# Patient Record
Sex: FEMALE | Race: WHITE | NOT HISPANIC OR LATINO | Employment: OTHER | URBAN - METROPOLITAN AREA
[De-identification: names, ages, dates, MRNs, and addresses within clinical notes are randomized per-mention and may not be internally consistent; named-entity substitution may affect disease eponyms.]

---

## 2017-01-04 ENCOUNTER — OFFICE VISIT (OUTPATIENT)
Dept: OBSTETRICS AND GYNECOLOGY | Facility: CLINIC | Age: 67
End: 2017-01-04

## 2017-01-04 VITALS
WEIGHT: 127 LBS | BODY MASS INDEX: 22.5 KG/M2 | DIASTOLIC BLOOD PRESSURE: 70 MMHG | SYSTOLIC BLOOD PRESSURE: 118 MMHG | HEIGHT: 63 IN

## 2017-01-04 DIAGNOSIS — B96.89 BACTERIAL VAGINOSIS: ICD-10-CM

## 2017-01-04 DIAGNOSIS — N76.0 BACTERIAL VAGINOSIS: ICD-10-CM

## 2017-01-04 DIAGNOSIS — B37.9 CANDIDIASIS: Primary | ICD-10-CM

## 2017-01-04 PROCEDURE — 87210 SMEAR WET MOUNT SALINE/INK: CPT | Performed by: OBSTETRICS & GYNECOLOGY

## 2017-01-04 PROCEDURE — 83986 ASSAY PH BODY FLUID NOS: CPT | Performed by: OBSTETRICS & GYNECOLOGY

## 2017-01-04 PROCEDURE — 99213 OFFICE O/P EST LOW 20 MIN: CPT | Performed by: OBSTETRICS & GYNECOLOGY

## 2017-01-04 RX ORDER — METRONIDAZOLE 7.5 MG/G
GEL VAGINAL DAILY
Qty: 70 G | Refills: 0 | Status: SHIPPED | OUTPATIENT
Start: 2017-01-04 | End: 2017-06-29

## 2017-01-04 RX ORDER — OCTISALATE, AVOBENZONE, HOMOSALATE, AND OCTOCRYLENE 29.4; 29.4; 49; 25.48 MG/ML; MG/ML; MG/ML; MG/ML
1 LOTION TOPICAL DAILY
COMMUNITY
End: 2017-06-29

## 2017-01-04 RX ORDER — FLUCONAZOLE 150 MG/1
150 TABLET ORAL EVERY OTHER DAY
Qty: 3 TABLET | Refills: 0 | Status: SHIPPED | OUTPATIENT
Start: 2017-01-04 | End: 2017-01-09

## 2017-01-04 NOTE — MR AVS SNAPSHOT
Xiao Dumont   1/4/2017 3:15 PM   Office Visit    Dept Phone:  [phone]   Encounter #:  [id number]    Provider:  Jose Brown MD   Department:  [facility] [place]                Your Full Care Plan              Your Updated Medication List          This list is accurate as of: 1/4/17  3:59 PM.  Always use your most recent med list.                ALIGN 4 MG capsule       aspirin 81 MG EC tablet       azelaic acid 15 % cream   Commonly known as:  AZELEX       BENEFIBER DRINK MIX PO       BEPREVE 1.5 % solution   Generic drug:  Bepotastine Besilate       CALCIUM 600+D 600-400 MG-UNIT per tablet   Generic drug:  calcium carbonate-vitamin d       conjugated estrogens 0.625 MG/GM vaginal cream   Commonly known as:  PREMARIN   Insert  into the vagina 3 (three) times a week. 0.5 gms 3 x a week       DAILY VITAMIN FORMULA PO       famotidine 40 MG tablet   Commonly known as:  PEPCID       hydrocortisone-pramoxine 2.5-1 % rectal cream   Commonly known as:  ANALPRAM-HC       Loratadine 10 MG capsule       lovastatin 40 MG tablet   Commonly known as:  MEVACOR       polyethyl glycol-propyl glycol 0.4-0.3 % solution ophthalmic solution   Commonly known as:  SYSTANE       tretinoin 0.05 % cream   Commonly known as:  RETIN-A               You Were Diagnosed With        Codes Comments    Candidiasis    -  Primary ICD-10-CM: B37.9  ICD-9-CM: 112.9     Bacterial vaginosis     ICD-10-CM: N76.0, B96.89  ICD-9-CM: 616.10, 041.9       Instructions     None    Patient Instructions History      Upcoming Appointments     Visit Type Date Time Department    GYN FOLLOW UP 1/4/2017  3:15 PM [facility]    GYN FOLLOW UP 6/29/2017  1:30 PM [facility]      MyChart Signup     Our records indicate that you have an active [employer] account.    You can view your After Visit Summary by going to [email or web address] and logging in with your  "[employer] username and password.  If you don't have a [employer] username and password but a parent or guardian has access to your record, the parent or guardian should login with their own [employer] username and password and access your record to view the After Visit Summary.    If you have questions, you can email [email or web address] or call [phone] to talk to our [employer] staff.  Remember, [employer] is NOT to be used for urgent needs.  For medical emergencies, dial 911.               Other Info from Your Visit           Your Appointments     Jun 29, 2017  1:30 PM EDT   GYN FOLLOW UP with Jose Brown MD   [facility] [place] (--)    [address] [place] [address] [address] [place] [id number]   [phone]              Allergies     Levaquin [Levofloxacin]  Rash    Sulfa Antibiotics  Rash      Reason for Visit     Vaginitis c/o vaginal malodor      Vital Signs     Blood Pressure Height Weight Last Menstrual Period Body Mass Index Smoking Status    118/70 62.5\" (158.8 cm) 127 lb (57.6 kg) (LMP Unknown) 22.86 kg/m2 Never Smoker      Problems and Diagnoses Noted     Candida infection    -  Primary    Bacterial vaginal infection            "

## 2017-01-04 NOTE — PROGRESS NOTES
"   Chief Complaint   Patient presents with   • Vaginitis     c/o vaginal malodor       Xiao Dumont is a 66 y.o. year old  presenting to be seen because of a three-week history of vaginal malodor, and itching.  She had a steroid injection 3 weeks ago.  She has not been on antibiotics.  She denies loose stools.    History   Sexual Activity   • Sexual activity: Yes   • Partners: Male   • Birth control/ protection: Post-menopausal, Surgical   She would not like to be screened for STD's at today's exam.       SCREENING TESTS    Year 2012 2016 2017 2018   Age                         PAP                         HPV high risk                         Mammogram                         STACEY score                         Breast MRI                         Lipids                         Vitamin D                         Colonoscopy                         DEXA  Frax (hip/any)                         Ovarian Screen                           Enter the month test was performed.  If month not known, enter \"X'  · Black numbers = normal results  · Red numbers = abnormal results  · Black X = patient reported normal  · Red X - patient reported abnormal      Referred by:    Profession:    Other info:        No Additional Complaints Reported    The following portions of the patient's history were reviewed and updated as appropriate:vital signs and   She  does not have any pertinent problems on file.  She  has a past surgical history that includes Hysterectomy; supracervical hysterectomy salpingo oophorectomy; Trigger finger release (Left); Tubal ligation; and Appendectomy.  Current Outpatient Prescriptions   Medication Sig Dispense Refill   • calcium carbonate-vitamin d (CALCIUM 600+D) 600-400 MG-UNIT per tablet Take 1 tablet by mouth Daily.     • Probiotic Product (ALIGN) 4 MG capsule Take 1 capsule by mouth Daily.     • aspirin 81 MG EC tablet " "Take 81 mg by mouth daily.     • azelaic acid (AZELEX) 15 % cream Apply 1 application topically 2 (two) times a day.     • BEPREVE 1.5 % solution INSERT 1 DROP INTO OU 2 TIMES DAILY UTD  3   • conjugated estrogens (PREMARIN) 0.625 MG/GM vaginal cream Insert  into the vagina 3 (three) times a week. 0.5 gms 3 x a week 30 g 3   • famotidine (PEPCID) 40 MG tablet Take 40 mg by mouth daily.     • fluconazole (DIFLUCAN) 150 MG tablet Take 1 tablet by mouth Every Other Day for 3 doses. 1 Every other day 3 tablet 0   • hydrocortisone-pramoxine (ANALPRAM-HC) 2.5-1 % rectal cream Insert 1 application into the rectum 3 (three) times a day.     • Loratadine 10 MG capsule Take 1 tablet by mouth as needed.     • lovastatin (MEVACOR) 40 MG tablet Take 40 mg by mouth every night.     • metroNIDAZOLE (METROGEL VAGINAL) 0.75 % vaginal gel Insert  into the vagina Daily. Insert intravaginally at bedtime for 5 days 70 g 0   • Multiple Vitamin (DAILY VITAMIN FORMULA PO) Take 1 tablet by mouth daily.     • polyethyl glycol-propyl glycol (SYSTANE) 0.4-0.3 % solution ophthalmic solution Administer 1 drop to both eyes daily.     • tretinoin (RETIN-A) 0.05 % cream Apply 1 application topically every night.     • Wheat Dextrin (BENEFIBER DRINK MIX PO) Take 1 dose by mouth daily.       No current facility-administered medications for this visit.      She is allergic to levaquin [levofloxacin] and sulfa antibiotics..        Review of Systems  A comprehensive review of systems was negative.  Constitutional: negative for fever, chills, activity change, appetite change, fatigue and unexpected weight change.  Respiratory: negative  Cardiovascular: negative  Gastrointestinal: negative  Genitourinary:positive for malodor and discharge  Musculoskeletal:negative  Behavioral/Psych: negative            Visit Vitals   • /70   • Ht 62.5\" (158.8 cm)   • Wt 127 lb (57.6 kg)   • LMP  (LMP Unknown)   • BMI 22.86 kg/m2       Physical Exam    General:  " alert; cooperative; well developed; well nourished   Skin:  No suspicious lesions seen   Thyroid: normal to inspection and palpation   Lungs:  clear to auscultation bilaterally   Heart:  regular rate and rhythm, S1, S2 normal, no murmur, click, rub or gallop   Breasts:  Not performed.   Abdomen: soft, non-tender; no masses  no umbilical or inginual hernias are present  no hepato-splenomegaly   Pelvis: Clinical staff was present for exam  External genitalia:  normal appearance of the external genitalia including Bartholin's and [place]'s glands  Vaginal:  normal pink mucosa without prolapse or lesions and wet prep done: vaginal pH = 4.0, clue cells are present and pseudo-hyphae are present  Cervix:  normal appearance  Uterus:  normal size, shape and consistency and anteverted  Adnexa:  non palpable bilaterally  Rectal:  digital rectal exam not performed     Lab Review   No data reviewed    Imaging   No data reviewed    Counseling was given to patient for the following topics: diagnostic results, instructions for management, risk factor reductions and impressions . Total time of the encounter was > 20 minute(s) and > 10 minute(s)  was spent counseling the patient about the need for combined therapy with MetroGel vaginal and the result.  She was instructed to avoid stool contamination into the vagina.          ASSESSMENT  Problems Addressed this Visit     None      Visit Diagnoses     Candidiasis    -  Primary    Relevant Medications    fluconazole (DIFLUCAN) 150 MG tablet    Bacterial vaginosis        Relevant Medications    metroNIDAZOLE (METROGEL VAGINAL) 0.75 % vaginal gel            PLAN    Medications prescribed this encounter:    New Medications Ordered This Visit   Medications   • calcium carbonate-vitamin d (CALCIUM 600+D) 600-400 MG-UNIT per tablet     Sig: Take 1 tablet by mouth Daily.   • Probiotic Product (ALIGN) 4 MG capsule     Sig: Take 1 capsule by mouth Daily.   • fluconazole (DIFLUCAN) 150 MG tablet      Sig: Take 1 tablet by mouth Every Other Day for 3 doses. 1 Every other day     Dispense:  3 tablet     Refill:  0     QOD   • metroNIDAZOLE (METROGEL VAGINAL) 0.75 % vaginal gel     Sig: Insert  into the vagina Daily. Insert intravaginally at bedtime for 5 days     Dispense:  70 g     Refill:  0   ·   · Follow up: 10 month(s)  · Calcium, 600 mg/ Vit. D, 400 IU daily     *Please note that portions of this documentation may have been completed with a voice recognition program.  Efforts were made to edit this dictation, but occasional words may have been mistranscribed.     This note was electronically signed.    LEONARD Brown MD  January 4, 2017  4:00 PM

## 2017-01-30 ENCOUNTER — TELEPHONE (OUTPATIENT)
Dept: OBSTETRICS AND GYNECOLOGY | Facility: CLINIC | Age: 67
End: 2017-01-30

## 2017-01-30 NOTE — TELEPHONE ENCOUNTER
"Pt calling with complaint that vaginal odor has come back . States she was here 1/4/17 but Dr BLACK \"could not check for everything because she had used Premarin Vag cream a few days prior\". We have scheduled her to come in 2/6/17 and instructed her not to use any vaginal products between now and her appt. She voices understanding.    "

## 2017-02-06 ENCOUNTER — OFFICE VISIT (OUTPATIENT)
Dept: OBSTETRICS AND GYNECOLOGY | Facility: CLINIC | Age: 67
End: 2017-02-06

## 2017-02-06 VITALS
WEIGHT: 127.8 LBS | SYSTOLIC BLOOD PRESSURE: 146 MMHG | BODY MASS INDEX: 22.64 KG/M2 | HEIGHT: 63 IN | DIASTOLIC BLOOD PRESSURE: 76 MMHG

## 2017-02-06 DIAGNOSIS — N89.8 VAGINAL ODOR: Primary | ICD-10-CM

## 2017-02-06 PROCEDURE — 83986 ASSAY PH BODY FLUID NOS: CPT | Performed by: OBSTETRICS & GYNECOLOGY

## 2017-02-06 PROCEDURE — 87210 SMEAR WET MOUNT SALINE/INK: CPT | Performed by: OBSTETRICS & GYNECOLOGY

## 2017-02-06 PROCEDURE — 99213 OFFICE O/P EST LOW 20 MIN: CPT | Performed by: OBSTETRICS & GYNECOLOGY

## 2017-02-06 NOTE — PROGRESS NOTES
"Chief Complaint   Patient presents with   • Vaginitis     c/o malodor       Xiao Dumont is a 66 y.o. year old  presenting to be seen for evaluation of vaginal symptoms.  She gives a 2-3 week history of vaginal malodor.  She has had some burning but no itching.  She states that she had odor before when she took a probiotic and she has restarted it.  She stopped using her Premarin cream but it did not seem to make a significant difference.  She has known vaginal atrophy.  She denies vaginal discharge.       A comprehensive review of systems was negative.  Constitutional: negative for fever, chills, activity change, appetite change, fatigue and unexpected weight change.  Respiratory: negative  Cardiovascular: negative  Gastrointestinal: negative  Genitourinary:negative  Musculoskeletal:negative  Behavioral/Psych: negative  Physical exam:  Lungs- Clear P&A  Heart- RRR with no murmur, rub or gallop  Abdomen-soft and nontender with no masses or organomegaly.  There is no suprapubic tenderness  Visit Vitals   • /76   • Ht 62.5\" (158.8 cm)   • Wt 127 lb 12.8 oz (58 kg)   • LMP  (LMP Unknown)   • BMI 23 kg/m2       {Findings; vagina (ob1):97308    normal appearing cervix without discharge or lesions, no discharge noted, WET MOUNT done - results: negative for pathogens, normal epithelial cells, KOH done, vaginal pH is 4.0, cervical motion tenderness absent    PLAN   1. Medications prescribed this encounter:  [unfilled]  2. Follow up:  Annual visit  *Please note that portions of this documentation may have been completed with a voice recognition program.  Efforts were made to edit this dictation, but occasional words may have been mistranscribed.      This note was electronically signed.    LEONARD Brown MD  2017  3:37 PM  "

## 2017-04-26 DIAGNOSIS — Z13.820 SCREENING FOR OSTEOPOROSIS: Primary | ICD-10-CM

## 2017-05-10 ENCOUNTER — HOSPITAL ENCOUNTER (OUTPATIENT)
Dept: BONE DENSITY | Facility: HOSPITAL | Age: 67
Discharge: HOME | End: 2017-05-10
Attending: OBSTETRICS & GYNECOLOGY | Admitting: OBSTETRICS & GYNECOLOGY

## 2017-05-10 DIAGNOSIS — Z13.820 SCREENING FOR OSTEOPOROSIS: ICD-10-CM

## 2017-05-10 PROCEDURE — 77080 DXA BONE DENSITY AXIAL: CPT

## 2017-05-10 PROCEDURE — 77080 DXA BONE DENSITY AXIAL: CPT | Performed by: RADIOLOGY

## 2017-06-29 ENCOUNTER — OFFICE VISIT (OUTPATIENT)
Dept: OBSTETRICS AND GYNECOLOGY | Facility: CLINIC | Age: 67
End: 2017-06-29

## 2017-06-29 VITALS
HEIGHT: 63 IN | SYSTOLIC BLOOD PRESSURE: 118 MMHG | WEIGHT: 129.6 LBS | DIASTOLIC BLOOD PRESSURE: 68 MMHG | BODY MASS INDEX: 22.96 KG/M2

## 2017-06-29 DIAGNOSIS — K64.4 EXTERNAL HEMORRHOIDS: ICD-10-CM

## 2017-06-29 DIAGNOSIS — N95.2 VAGINAL ATROPHY: ICD-10-CM

## 2017-06-29 DIAGNOSIS — Z78.0 MENOPAUSE: Primary | ICD-10-CM

## 2017-06-29 DIAGNOSIS — M85.80 OSTEOPENIA: ICD-10-CM

## 2017-06-29 PROCEDURE — G0101 CA SCREEN;PELVIC/BREAST EXAM: HCPCS | Performed by: OBSTETRICS & GYNECOLOGY

## 2017-06-29 NOTE — PROGRESS NOTES
"   Chief Complaint   Patient presents with   • Gynecologic Exam     ? urethral stenosis   • Med Refill       Xiao Dumont is a 67 y.o. year old  presenting to be seen for her annual exam.This patient has previously had a robotically-assisted LSH/BSO.  She has complaints of some difficulty initiating her urinary stream.  She has intermittent stress incontinence.  She has no symptoms of cystitis.  She uses Premarin vaginal cream 3 times a week for vaginal atrophy.  She has a history of external hemorrhoids.    SCREENING TESTS    Year 2012   Age                         PAP   Neg.                      HPV high risk                         Mammogram      benign                   STACEY score                         Breast MRI                         Lipids                         Vitamin D                         Colonoscopy                         DEXA  Frax (hip/any)      osteopenia                   Ovarian Screen                           Enter the month test was performed.  If month not known, enter \"X'  · Black numbers = normal results  · Red numbers = abnormal results  · Black X = patient reported normal  · Red X - patient reported abnormal      Referred by:    Profession:    Other info:        She exercises regularly: yes.  She wears her seat belt: yes.  She has concerns about domestic violence: no.  She has noticed changes in height: no    GYN screening history:  · Last mammogram: was done on approximately 2017 and the result was: Birads II (Benign findings).  · Last DEXA: was done on approximately 5/10/2017 and the results were: osteopenia of the femoral neck.    No Additional Complaints Reported    The following portions of the patient's history were reviewed and updated as appropriate:vital signs and   She  does not have any pertinent problems on file.  She  has a past surgical history that includes " Hysterectomy; supracervical hysterectomy salpingo oophorectomy; Trigger finger release (Left); Tubal ligation; and Appendectomy.  Her family history includes Breast cancer in her mother; Cancer in her brother; Dementia in her father; Diabetes in her paternal grandmother; Stroke in her brother, father, paternal grandfather, and paternal grandmother.  She  reports that she has never smoked. She has never used smokeless tobacco. She reports that she does not drink alcohol or use illicit drugs.  Current Outpatient Prescriptions   Medication Sig Dispense Refill   • aspirin 81 MG EC tablet Take 81 mg by mouth daily.     • azelaic acid (AZELEX) 15 % cream Apply 1 application topically 2 (two) times a day.     • calcium carbonate-vitamin d (CALCIUM 600+D) 600-400 MG-UNIT per tablet Take 1 tablet by mouth Daily.     • [START ON 6/30/2017] conjugated estrogens (PREMARIN) 0.625 MG/GM vaginal cream Insert  into the vagina 3 (Three) Times a Week. 0.5 gms 3 x a week 30 g 3   • famotidine (PEPCID) 40 MG tablet Take 40 mg by mouth daily.     • hydrocortisone-pramoxine (ANALPRAM-HC) 2.5-1 % rectal cream Insert 1 application into the rectum 2 (Two) Times a Day. 30 g 4   • Loratadine 10 MG capsule Take 1 tablet by mouth as needed.     • lovastatin (MEVACOR) 40 MG tablet Take 40 mg by mouth every night.     • Multiple Vitamin (DAILY VITAMIN FORMULA PO) Take 1 tablet by mouth daily.     • polyethyl glycol-propyl glycol (SYSTANE) 0.4-0.3 % solution ophthalmic solution Administer 1 drop to both eyes daily.     • tretinoin (RETIN-A) 0.05 % cream Apply 1 application topically every night.       No current facility-administered medications for this visit.      She is allergic to levaquin [levofloxacin] and sulfa antibiotics..    Review of Systems  A comprehensive review of systems was taken.  Constitutional: negative for fever, chills, activity change, appetite change, fatigue and unexpected weight change.  Respiratory:  "negative  Cardiovascular: negative  Gastrointestinal: negative  Genitourinary:positive for urinary incontinence  Musculoskeletal:negative  Behavioral/Psych: negative       /68  Ht 62.5\" (158.8 cm)  Wt 129 lb 9.6 oz (58.8 kg)  LMP  (LMP Unknown)  BMI 23.33 kg/m2    Physical Exam    General:  alert; cooperative; well developed; well nourished   Skin:  No suspicious lesions seen   Thyroid: normal to inspection and palpation   Lungs:  clear to auscultation bilaterally   Heart:  regular rate and rhythm, S1, S2 normal, no murmur, click, rub or gallop   Breasts:  Examined in supine position  Symmetric without masses or skin dimpling  Nipples normal without inversion, lesions or discharge  There are no palpable axillary nodes  fibrous change at 3:00 in the right breast   Abdomen: soft, non-tender; no masses  no umbilical or inginual hernias are present  no hepato-splenomegaly   Pelvis: Clinical staff was present for exam  External genitalia:  normal appearance of the external genitalia including Bartholin's and [place]'s glands.  Vaginal:  normal pink mucosa without prolapse or lesions.  Cervix:  normal appearance.  Uterus:  absent.  Adnexa:  absent, bilateral.  Rectal:  external hemorrhoids present;     Lab Review   No data reviewed    Imaging  Mammogram results- benign,  and DEXA- osteopenia of the femoral neck         ASSESSMENT  Problems Addressed this Visit        Cardiovascular and Mediastinum    External hemorrhoids    Relevant Medications    hydrocortisone-pramoxine (ANALPRAM-HC) 2.5-1 % rectal cream       Musculoskeletal and Integument    Osteopenia       Genitourinary    Vaginal atrophy    Relevant Medications    conjugated estrogens (PREMARIN) 0.625 MG/GM vaginal cream (Start on 6/30/2017)    Menopause - Primary          PLAN    Medications prescribed this encounter:    New Medications Ordered This Visit   Medications   • conjugated estrogens (PREMARIN) 0.625 MG/GM vaginal cream     Sig: Insert  into the " vagina 3 (Three) Times a Week. 0.5 gms 3 x a week     Dispense:  30 g     Refill:  3   • hydrocortisone-pramoxine (ANALPRAM-HC) 2.5-1 % rectal cream     Sig: Insert 1 application into the rectum 2 (Two) Times a Day.     Dispense:  30 g     Refill:  4   · Pap test done  · Calcium, 600 mg/ Vit. D, 400 IU daily; regular weight-bearing exercise  · Follow up: 12 month(s)  *Please note that portions of this documentation may have been completed with a voice recognition program.  Efforts were made to edit this dictation, but occasional words may have been mistranscribed.       This note was electronically signed.    LEONARD Brown MD  June 29, 2017  2:05 PM

## 2017-11-13 ENCOUNTER — OFFICE VISIT (OUTPATIENT)
Dept: OBSTETRICS AND GYNECOLOGY | Facility: CLINIC | Age: 67
End: 2017-11-13

## 2017-11-13 VITALS
BODY MASS INDEX: 23.07 KG/M2 | WEIGHT: 130.2 LBS | DIASTOLIC BLOOD PRESSURE: 82 MMHG | SYSTOLIC BLOOD PRESSURE: 136 MMHG | HEIGHT: 63 IN

## 2017-11-13 DIAGNOSIS — N95.2 VAGINAL ATROPHY: ICD-10-CM

## 2017-11-13 DIAGNOSIS — K64.4 EXTERNAL HEMORRHOIDS: ICD-10-CM

## 2017-11-13 DIAGNOSIS — Z78.0 MENOPAUSE: Primary | ICD-10-CM

## 2017-11-13 PROCEDURE — 99213 OFFICE O/P EST LOW 20 MIN: CPT | Performed by: OBSTETRICS & GYNECOLOGY

## 2017-11-13 NOTE — PROGRESS NOTES
Chief Complaint   Patient presents with   • Vaginitis     c/o vulvar burning, no malodor, very little discharge       Xiao Dumont is a 67 y.o. year old  presenting to be seen because of complaints of vulvar and perineal burning as well as discomfort in the perianal region.  This patient has had an LSH/BSO.  She is treated for vaginal atrophy with Premarin vaginal cream, 0.5 g 3 times a week with relief of symptoms.  Since 2017 she has noted significant burning in the perineal and perianal region.  This wakes her up at night with itching.  She has continued use of Premarin cream.  She denies other menopausal symptoms.  She has no vaginal discharge.    History   Sexual Activity   • Sexual activity: Yes   • Partners: Male   • Birth control/ protection: Post-menopausal, Surgical     SCREENING TESTS    Year 2012   Age                         PAP      Neg.                   HPV high risk                         Mammogram     benign benign                   STACEY score                         Breast MRI                         Lipids                         Vitamin D                         Colonoscopy                         DEXA  Frax (hip/any)      osteopenia                   Ovarian Screen                             No Additional Complaints Reported    The following portions of the patient's history were reviewed and updated as appropriate:vital signs and   She  does not have any pertinent problems on file.  She  has a past surgical history that includes Hysterectomy; supracervical hysterectomy salpingo oophorectomy; Trigger finger release (Left); Tubal ligation; and Appendectomy.  Current Outpatient Prescriptions   Medication Sig Dispense Refill   • aspirin 81 MG EC tablet Take 81 mg by mouth daily.     • azelaic acid (AZELEX) 15 % cream Apply 1 application topically 2 (two) times a day.     • calcium  "carbonate-vitamin d (CALCIUM 600+D) 600-400 MG-UNIT per tablet Take 1 tablet by mouth Daily.     • conjugated estrogens (PREMARIN) 0.625 MG/GM vaginal cream Insert  into the vagina 3 (Three) Times a Week. 0.5 gms 3 x a week 30 g 3   • famotidine (PEPCID) 40 MG tablet Take 40 mg by mouth daily.     • hydrocortisone-pramoxine (ANALPRAM-HC) 2.5-1 % rectal cream Insert 1 application into the rectum 2 (Two) Times a Day. 30 g 4   • Loratadine 10 MG capsule Take 1 tablet by mouth as needed.     • lovastatin (MEVACOR) 40 MG tablet Take 40 mg by mouth every night.     • Multiple Vitamin (DAILY VITAMIN FORMULA PO) Take 1 tablet by mouth daily.     • polyethyl glycol-propyl glycol (SYSTANE) 0.4-0.3 % solution ophthalmic solution Administer 1 drop to both eyes daily.     • tretinoin (RETIN-A) 0.05 % cream Apply 1 application topically every night.       No current facility-administered medications for this visit.      She is allergic to levaquin [levofloxacin] and sulfa antibiotics..        Review of Systems  A comprehensive review of systems was not done.  Constitutional: negative for fever, chills, activity change, appetite change, fatigue and unexpected weight change.  Respiratory: not done  Cardiovascular: not done  Gastrointestinal: perianal itching and burning  Genitourinary:negative  Musculoskeletal:not done  Behavioral/Psych: not done          /82  Ht 62.5\" (158.8 cm)  Wt 130 lb 3.2 oz (59.1 kg)  LMP  (LMP Unknown) Comment: S/P LSH, BSO  BMI 23.43 kg/m2    Physical Exam    General:  alert; cooperative; well developed; well nourished   Skin:  Not performed.   Thyroid: not examined   Lungs:  Not performed.   Heart:  regular rate and rhythm, S1, S2 normal, no murmur, click, rub or gallop   Breasts:  Not performed.   Abdomen: soft, non-tender; no masses  no umbilical or inginual hernias are present  no hepato-splenomegaly   Pelvis: Clinical staff was present for exam  External genitalia:  normal appearance of the " external genitalia including Bartholin's and [place]'s glands.  Vaginal:  normal pink mucosa without prolapse or lesions.  Cervix:  normal appearance.  Uterus:  absent.  Adnexa:  absent, bilateral.  Rectal:  external hemorrhoids present; with erythema and ulceration of hemorrhoids     Lab Review   No data reviewed    Imaging   Mammogram results- benign, and DEXA- moderate osteopenia of the femoral neck    Medical counseling was given to patient for the following topics: diagnostic results, instructions for management, risk factor reductions, impressions and risks and benefits of treatment options . Total time of the encounter was 17 minute(s) and 11 minute(s)  was spent in face to face counseling.  I have counseled the patient that her vaginal and vulvar atrophy is improved using the Premarin vaginal cream 3 times a week.  I have counseled her that she has erythema and ulceration of her hemorrhoids.  I have counseled her to see the colon rectal surgeons regarding this.  Would not currently change her Premarin cream dose.          ASSESSMENT  Problems Addressed this Visit        Cardiovascular and Mediastinum    External hemorrhoids       Genitourinary    Vaginal atrophy    Menopause - Primary            PLAN    · Medications prescribed this encounter:  No orders of the defined types were placed in this encounter.  ·   · Follow up: 8 month(s)  · Calcium, 600 mg/ Vit. D, 400 IU daily     *Please note that portions of this documentation may have been completed with a voice recognition program.  Efforts were made to edit this dictation, but occasional words may have been mistranscribed.     This note was electronically signed.    LEONARD Brown MD  November 13, 2017  9:26 AM

## 2018-01-08 ENCOUNTER — OFFICE VISIT (OUTPATIENT)
Dept: OBSTETRICS AND GYNECOLOGY | Facility: CLINIC | Age: 68
End: 2018-01-08

## 2018-01-08 VITALS
DIASTOLIC BLOOD PRESSURE: 72 MMHG | SYSTOLIC BLOOD PRESSURE: 130 MMHG | WEIGHT: 128 LBS | BODY MASS INDEX: 22.68 KG/M2 | HEIGHT: 63 IN

## 2018-01-08 DIAGNOSIS — N95.2 VAGINAL ATROPHY: ICD-10-CM

## 2018-01-08 DIAGNOSIS — N89.8 VAGINAL ODOR: ICD-10-CM

## 2018-01-08 DIAGNOSIS — Z78.0 MENOPAUSE: Primary | ICD-10-CM

## 2018-01-08 PROCEDURE — 87210 SMEAR WET MOUNT SALINE/INK: CPT | Performed by: OBSTETRICS & GYNECOLOGY

## 2018-01-08 PROCEDURE — 83986 ASSAY PH BODY FLUID NOS: CPT | Performed by: OBSTETRICS & GYNECOLOGY

## 2018-01-08 PROCEDURE — 99213 OFFICE O/P EST LOW 20 MIN: CPT | Performed by: OBSTETRICS & GYNECOLOGY

## 2018-01-08 RX ORDER — ESTRADIOL 0.1 MG/G
0.5 CREAM VAGINAL 3 TIMES WEEKLY
Qty: 42.5 G | Refills: 3 | Status: SHIPPED | OUTPATIENT
Start: 2018-01-08 | End: 2018-01-09 | Stop reason: SDUPTHER

## 2018-01-08 RX ORDER — ESTRADIOL 0.1 MG/G
CREAM VAGINAL
Qty: 42.5 G | Refills: 3 | OUTPATIENT
Start: 2018-01-08 | End: 2018-01-08 | Stop reason: SDUPTHER

## 2018-01-08 NOTE — PROGRESS NOTES
"Chief Complaint   Patient presents with   • Vaginal Discharge     c/o malodor.  FYI, patient was instructed at last office visit to see colorectal doctor due to inflammation.  PCP advised patient to discontinue use of wipes and analpram, and symptoms have resolved.       Xiao Dumont is a 67 y.o. year old  presenting to be seen for evaluation of vaginal symptoms.This patient has vaginal atrophy and is currently treated with Premarin vaginal cream, 0.5 g 3 times a week.  She has noted recent vaginal malodor.  She denies itching or burning.  Her recent perianal inflammation resolved when she discontinued the use of wipes.  She is taking no.X or steroids.       A comprehensive review of systems was done.  Constitutional: negative for fever, chills, activity change, appetite change, fatigue and unexpected weight change.  Respiratory: negative  Cardiovascular: negative  Gastrointestinal: negative  Genitourinary:positive for vaginal odor  Musculoskeletal:negative  Behavioral/Psych: negative  Physical exam:  Lungs- Clear to auscultation  Heart- RRR with no murmur, rub or gallop  Abdomen- Soft, non-tender, no organomegaly or tenderness  /72  Ht 158.8 cm (62.5\")  Wt 58.1 kg (128 lb)  LMP  (LMP Unknown) Comment: S/P LSH, BSO  BMI 23.04 kg/m2     Pelvis:Clinical staff was present for exam  External genitalia:  normal appearance of the external genitalia including Bartholin's and [place]'s glands.  Vaginal:  normal pink mucosa without prolapse or lesions. pH = 4.0 wet prep done: normal epithelial cells are present;  Cervix:  normal appearance.  Uterus:  absent.  Adnexa:  absent, bilateral.  Rectal:  anus visually normal appearing. recto-vaginal exam unremarkable and confirms findings;     ASSESSMENT  Problems Addressed this Visit        Genitourinary    Vaginal atrophy    Relevant Medications    estradiol (ESTRACE VAGINAL) 0.1 MG/GM vaginal cream    Menopause - Primary      Other Visit Diagnoses     Vaginal odor     "      No evidence of vaginal infection    PLAN   Medications prescribed this encounter:    New Medications Ordered This Visit   Medications   • estradiol (ESTRACE VAGINAL) 0.1 MG/GM vaginal cream     Sig: Insert o.5 gm intravaginally 3 times a week     Dispense:  42.5 g     Refill:  3   1. I have change the patient from Premarin vaginal cream to Estrace vaginal cream to see if the odor resolves  2. Follow up: 6 month(s)  *Please note that portions of this documentation may have been completed with a voice recognition program.  Efforts were made to edit this dictation, but occasional words may have been mistranscribed.      This note was electronically signed.    LEONARD Brown MD  January 8, 2018  2:55 PM

## 2018-01-08 NOTE — TELEPHONE ENCOUNTER
Patient's prescription was sent to an incorrect pharmacy.  Pharmacy information has been updated, and I will send to correct pharmacy.

## 2018-01-09 RX ORDER — ESTRADIOL 0.1 MG/G
0.5 CREAM VAGINAL 3 TIMES WEEKLY
Qty: 42.5 G | Refills: 3 | Status: SHIPPED | OUTPATIENT
Start: 2018-01-10 | End: 2018-10-09 | Stop reason: SDUPTHER

## 2018-01-09 NOTE — TELEPHONE ENCOUNTER
Patient called today to request pharmacy change.  States that she would like her estrace vaginal cream prescription to go to [facility] in [place], and also states that she called [facility] to cancel the prescription sent there.

## 2018-07-09 ENCOUNTER — OFFICE VISIT (OUTPATIENT)
Dept: OBSTETRICS AND GYNECOLOGY | Facility: CLINIC | Age: 68
End: 2018-07-09

## 2018-07-09 VITALS
SYSTOLIC BLOOD PRESSURE: 116 MMHG | BODY MASS INDEX: 22.71 KG/M2 | HEIGHT: 63 IN | WEIGHT: 128.2 LBS | DIASTOLIC BLOOD PRESSURE: 68 MMHG

## 2018-07-09 DIAGNOSIS — Z78.0 MENOPAUSE: Primary | ICD-10-CM

## 2018-07-09 DIAGNOSIS — M85.88 OSTEOPENIA OF OTHER SITE: ICD-10-CM

## 2018-07-09 DIAGNOSIS — N95.2 VAGINAL ATROPHY: ICD-10-CM

## 2018-07-09 PROCEDURE — 99213 OFFICE O/P EST LOW 20 MIN: CPT | Performed by: OBSTETRICS & GYNECOLOGY

## 2018-07-09 NOTE — PROGRESS NOTES
Chief Complaint   Patient presents with   • Gynecologic Exam   • Agapito Beena Dumont is a 68 y.o. year old  presenting to be seen because of follow up for treatment of severe vulvar and vaginal irritation which was related to vaginal atrophy.  This patient was changed to Estrace vaginal cream in a 3 times/weekly dosage and has had significant decrease in vaginal dryness and vulvar irritation.  She desires to change back to Premarin vaginal cream which she was using previously.  She denies side effects.  She has no cardiovascular symptoms.  Breast imaging has been benign.  She has previously had a robotically--assisted LSH/BSO.  She has had an appendectomy.  She has mild osteopenia of the right femoral neck with no treatment other than daily calcium/vitamin D and regular weight-bearing exercise.    History   Sexual Activity   • Sexual activity: Yes   • Partners: Male   • Birth control/ protection: Post-menopausal, Surgical     SCREENING TESTS    Year 2012   Age                         PAP      Neg.                   HPV high risk                         Mammogram      benign benign                  STACEY score                         Breast MRI                         Lipids                         Vitamin D                         Colonoscopy                         DEXA  Frax (hip/any)      osteopenia                   Ovarian Screen                             No Additional Complaints Reported    The following portions of the patient's history were reviewed and updated as appropriate:vital signs and   She  has a past medical history of GERD (gastroesophageal reflux disease); Hyperlipidemia; Menopause; Osteoarthritis; Osteopenia; Rosacea, acne; Seasonal allergies; and Vaginal atrophy.  She  does not have any pertinent problems on file.  She  has a past surgical history that includes Hysterectomy;  "supracervical hysterectomy salpingo oophorectomy; Trigger finger release (Left); Tubal ligation; and Appendectomy.  Her family history includes Breast cancer in her mother; Cancer in her brother; Dementia in her father; Diabetes in her paternal grandmother; Stroke in her brother, father, paternal grandfather, and paternal grandmother.  She  reports that she has never smoked. She has never used smokeless tobacco. She reports that she does not drink alcohol or use drugs.  Current Outpatient Prescriptions   Medication Sig Dispense Refill   • aspirin 81 MG EC tablet Take 81 mg by mouth daily.     • Azelaic Acid (FINACEA) 15 % foam Apply  topically.     • calcium carbonate-vitamin d (CALCIUM 600+D) 600-400 MG-UNIT per tablet Take 1 tablet by mouth Daily.     • estradiol (ESTRACE VAGINAL) 0.1 MG/GM vaginal cream Insert 0.5 g into the vagina 3 (Three) Times a Week. 42.5 g 3   • famotidine (PEPCID) 40 MG tablet Take 40 mg by mouth daily.     • Loratadine 10 MG capsule Take 1 tablet by mouth as needed.     • lovastatin (MEVACOR) 40 MG tablet Take 40 mg by mouth every night.     • Multiple Vitamin (DAILY VITAMIN FORMULA PO) Take 1 tablet by mouth daily.     • polyethyl glycol-propyl glycol (SYSTANE) 0.4-0.3 % solution ophthalmic solution Administer 1 drop to both eyes daily.     • tretinoin (RETIN-A) 0.05 % cream Apply 1 application topically every night.       No current facility-administered medications for this visit.      She is allergic to levaquin [levofloxacin] and sulfa antibiotics..        Review of Systems  A comprehensive review of systems was not done.  Constitutional: negative for fever, chills, activity change, appetite change, fatigue and unexpected weight change.  Respiratory: not done  Cardiovascular: negative  Gastrointestinal: negative  Genitourinary:negative  Musculoskeletal:negative  Behavioral/Psych: not done          /68   Ht 158.8 cm (62.5\")   Wt 58.2 kg (128 lb 3.2 oz)   LMP  (LMP Unknown) " Comment: S/P LSH, BSO  BMI 23.07 kg/m²     Physical Exam    General:  alert; cooperative; well developed; well nourished   Skin:  Not performed.   Thyroid: not examined   Lungs:  clear to auscultation bilaterally   Heart:  regular rate and rhythm, S1, S2 normal, no murmur, click, rub or gallop   Breasts:  Examined in supine position  Symmetric without masses or skin dimpling  Nipples normal without inversion, lesions or discharge  There are no palpable axillary nodes   Abdomen: soft, non-tender; no masses  no umbilical or inginual hernias are present  no hepato-splenomegaly   Pelvis: Clinical staff was present for exam  External genitalia:  normal appearance of the external genitalia including Bartholin's and [place]'s glands.  Vaginal:  normal pink mucosa without prolapse or lesions.  Cervix:  normal appearance.  Uterus:  absent.  Adnexa:  absent, bilateral.  Rectal:  anus visually normal appearing. recto-vaginal exam unremarkable and confirms findings;     Lab Review   No data reviewed    Imaging   Mammogram results- Birads II, and DEXA- mild osteopenia of the right femoral neck with the spine and total hip being normal    Medical counseling was given to patient for the following topics: diagnostic results, instructions for management, risk factor reductions, impressions, risks and benefits of treatment options and importance of treatment compliance . Total time of the encounter was 19 minute(s) and 11 minute(s)  was spent in face to face counseling.  I have counseled the patient that she needs to continue using estrogen cream.  I have counseled her that we may switch back to Premarin vaginal cream since it is cheaper for her.  I have counseled her in monthly self-breast assessment and annual breast imaging.  I have counseled her to continue weight-bearing exercise and to take calcium with vitamin D daily.          ASSESSMENT  Problems Addressed this Visit        Musculoskeletal and Integument    Osteopenia        Genitourinary    Vaginal atrophy    Menopause - Primary            PLAN    · Medications prescribed this encounter:  No orders of the defined types were placed in this encounter.  · Monthly self breast assessment and annual breast imaging  · The patient will call when she needs a refill on Premarin vaginal cream, 0.5 g 3 times a week  · Follow up: 12 month(s)  · Calcium, 600 mg/ Vit. D, 400 IU daily     *Please note that portions of this documentation may have been completed with a voice recognition program.  Efforts were made to edit this dictation, but occasional words may have been mistranscribed.     This note was electronically signed.    LEONARD Brown MD  July 9, 2018  1:35 PM

## 2018-10-09 ENCOUNTER — TELEPHONE (OUTPATIENT)
Dept: OBSTETRICS AND GYNECOLOGY | Facility: CLINIC | Age: 68
End: 2018-10-09

## 2018-10-09 RX ORDER — ESTRADIOL 0.1 MG/G
0.5 CREAM VAGINAL 3 TIMES WEEKLY
Qty: 42.5 G | Refills: 6 | Status: SHIPPED | OUTPATIENT
Start: 2018-10-10 | End: 2019-07-10 | Stop reason: ALTCHOICE

## 2018-10-09 NOTE — TELEPHONE ENCOUNTER
Patient was seen 7/9/18 for annual exam but did not need a prescription refill for estrace cream at that time.  She calls today requesting a new prescription.

## 2019-07-10 ENCOUNTER — OFFICE VISIT (OUTPATIENT)
Dept: OBSTETRICS AND GYNECOLOGY | Facility: CLINIC | Age: 69
End: 2019-07-10

## 2019-07-10 VITALS
WEIGHT: 127 LBS | DIASTOLIC BLOOD PRESSURE: 58 MMHG | SYSTOLIC BLOOD PRESSURE: 124 MMHG | BODY MASS INDEX: 22.5 KG/M2 | HEIGHT: 63 IN

## 2019-07-10 DIAGNOSIS — Z01.419 ENCOUNTER FOR GYNECOLOGICAL EXAMINATION WITHOUT ABNORMAL FINDING: ICD-10-CM

## 2019-07-10 DIAGNOSIS — Z78.0 MENOPAUSE: Primary | ICD-10-CM

## 2019-07-10 DIAGNOSIS — N95.2 VAGINAL ATROPHY: ICD-10-CM

## 2019-07-10 PROCEDURE — G0101 CA SCREEN;PELVIC/BREAST EXAM: HCPCS | Performed by: OBSTETRICS & GYNECOLOGY

## 2019-07-10 NOTE — PROGRESS NOTES
Chief Complaint   Patient presents with   • Gynecologic Exam   • Med Refill     would like compounded estradiol cream       Xiao Dumont is a 69 y.o. year old  presenting to be seen for her annual exam.  This patient has previously had tubal sterilization and has had an appendectomy.  She underwent an LSH/BSO.  She uses estradiol vaginal cream, 0.5 g 3 times a week to treat symptoms of vaginal atrophy.  This has become extremely expensive and she desires a compounded product.  She has no side effects on estrogen vaginal cream.  She denies bowel or urinary symptoms.  She has a history of moderate osteopenia of the femoral neck.  She has had no fractures.    SCREENING TESTS    Year 2012   Age                         PAP      Neg.                   HPV high risk                         Mammogram       benign benign                 STACEY score                         Breast MRI                         Lipids                         Vitamin D                         Colonoscopy                         DEXA  Frax (hip/any)      Penia                   Ovarian Screen                             She exercises regularly: yes.  She wears her seat belt: yes.  She has concerns about domestic violence: no.  She has noticed changes in height: no    GYN screening history:  · Last mammogram: was done on approximately 2019 and the result was: Birads II (Benign findings).  · Last DEXA: was done on approximately 5/10/2017 and the results were: moderate osteopenia of the right femoral neck.    No Additional Complaints Reported    The following portions of the patient's history were reviewed and updated as appropriate:vital signs and   She  has a past medical history of GERD (gastroesophageal reflux disease), Hyperlipidemia, Menopause, Osteoarthritis, Osteopenia, Rosacea, acne, Seasonal allergies, and Vaginal atrophy.  She  "does not have any pertinent problems on file.  She  has a past surgical history that includes Hysterectomy; supracervical hysterectomy salpingo oophorectomy; Trigger finger release (Left); Tubal ligation; and Appendectomy.  Her family history includes Breast cancer in her mother; Cancer in her brother; Dementia in her father; Diabetes in her paternal grandmother; Stroke in her brother, father, paternal grandfather, and paternal grandmother.  She  reports that she has never smoked. She has never used smokeless tobacco. She reports that she does not drink alcohol or use drugs.  Current Outpatient Medications   Medication Sig Dispense Refill   • aspirin 81 MG EC tablet Take 81 mg by mouth daily.     • Azelaic Acid (FINACEA) 15 % foam Apply  topically.     • calcium carbonate-vitamin d (CALCIUM 600+D) 600-400 MG-UNIT per tablet Take 1 tablet by mouth Daily.     • famotidine (PEPCID) 40 MG tablet Take 40 mg by mouth daily.     • Loratadine 10 MG capsule Take 1 tablet by mouth as needed.     • lovastatin (MEVACOR) 40 MG tablet Take 40 mg by mouth every night.     • Multiple Vitamin (DAILY VITAMIN FORMULA PO) Take 1 tablet by mouth daily.     • polyethyl glycol-propyl glycol (SYSTANE) 0.4-0.3 % solution ophthalmic solution Administer 1 drop to both eyes daily.     • tretinoin (RETIN-A) 0.05 % cream Apply 1 application topically every night.       No current facility-administered medications for this visit.      She is allergic to levaquin [levofloxacin]; sulfa antibiotics; and sulfamethoxazole-trimethoprim..    Review of Systems  A comprehensive review of systems was taken.  Constitutional: negative for fever, chills, activity change, appetite change, fatigue and unexpected weight change.  Respiratory: negative  Cardiovascular: negative  Gastrointestinal: negative  Genitourinary:negative  Musculoskeletal:negative  Behavioral/Psych: negative       /58   Ht 158.8 cm (62.5\")   Wt 57.6 kg (127 lb)   LMP  (LMP Unknown) " Comment: S/P LSH, BSO  Breastfeeding? No   BMI 22.86 kg/m²     Physical Exam    General:  alert; cooperative; well developed; well nourished   Skin:  No suspicious lesions seen   Thyroid: normal to inspection and palpation   Lungs:  clear to auscultation bilaterally   Heart:  regular rate and rhythm, S1, S2 normal, no murmur, click, rub or gallop   Breasts:  Examined in supine position  Symmetric without masses or skin dimpling  Nipples normal without inversion, lesions or discharge  There are no palpable axillary nodes  Fibrocystic changes are present both breasts without a discrete mass   Abdomen: soft, non-tender; no masses  no umbilical or inguinal hernias are present  no hepato-splenomegaly   Pelvis: Clinical staff was present for exam  External genitalia:  normal appearance of the external genitalia including Bartholin's and [place]'s glands.  Vaginal:  normal pink mucosa without prolapse or lesions. pH = 4.5  Cervix:  normal appearance.  Uterus:  absent.  Adnexa:  absent, bilateral.  Rectal:  anus visually normal appearing. recto-vaginal exam unremarkable and confirms findings;     Lab Review   No data reviewed    Imaging  Mammogram results- Birads II, and DEXA- moderate osteopenia of the right femoral neck with the total hip and spine normal         ASSESSMENT  Problems Addressed this Visit        Genitourinary    Vaginal atrophy    Menopause - Primary      Other Visit Diagnoses     Encounter for gynecological examination without abnormal finding              PLAN    · Medications prescribed this encounter:  No orders of the defined types were placed in this encounter.  · Monthly self breast assessment and annual breast imaging  · Compounded estradiol cream, 0.5 g intravaginally 3 times a week  · Calcium, 600 mg/ Vit. D, 400 IU daily; regular weight-bearing exercise  · Follow up: 12 month(s)  *Please note that portions of this documentation may have been completed with a voice recognition program.  Efforts  were made to edit this dictation, but occasional words may have been mistranscribed.       This note was electronically signed.    LEONARD Brown MD  July 10, 2019  1:21 PM

## 2019-09-11 ENCOUNTER — TELEPHONE (OUTPATIENT)
Dept: OBSTETRICS AND GYNECOLOGY | Facility: CLINIC | Age: 69
End: 2019-09-11

## 2019-09-11 RX ORDER — ESTRADIOL 0.1 MG/G
CREAM VAGINAL
COMMUNITY
End: 2020-07-15 | Stop reason: ALTCHOICE

## 2020-01-21 DIAGNOSIS — M85.89 OSTEOPENIA OF MULTIPLE SITES: Primary | ICD-10-CM

## 2020-03-10 ENCOUNTER — OFFICE VISIT (OUTPATIENT)
Dept: OBSTETRICS AND GYNECOLOGY | Facility: CLINIC | Age: 70
End: 2020-03-10

## 2020-03-10 VITALS
BODY MASS INDEX: 23 KG/M2 | DIASTOLIC BLOOD PRESSURE: 76 MMHG | WEIGHT: 129.8 LBS | SYSTOLIC BLOOD PRESSURE: 134 MMHG | HEIGHT: 63 IN

## 2020-03-10 DIAGNOSIS — Z78.0 MENOPAUSE: ICD-10-CM

## 2020-03-10 DIAGNOSIS — N95.2 VAGINAL ATROPHY: Primary | ICD-10-CM

## 2020-03-10 PROCEDURE — 99213 OFFICE O/P EST LOW 20 MIN: CPT | Performed by: OBSTETRICS & GYNECOLOGY

## 2020-03-10 NOTE — PROGRESS NOTES
Chief Complaint   Patient presents with   • Follow-up     c/o vaginal pressure.  patient noted that the estrogen cream applicator was difficult to insert with last application.       Xiao Dumont is a 69 y.o. year old  presenting to be seen because of concern about a possible change in her pelvic examination.  This patient has had tubal sterilization; a laparoscopic supracervical hysterectomy/bilateral salpingo-oophorectomy; and an appendectomy.  She is currently treated for vaginal atrophy with estradiol vaginal cream, 0.5 g 3 times a week.  She has experienced a decrease in vaginal irritation using this medication.  She has no side effects on estradiol vaginal cream.  Last week she noted some discomfort with insertion of the estrogen cream.  She also began to experience some pelvic pressure and irritation.  She denies vaginal itching or burning.    Social History     Substance and Sexual Activity   Sexual Activity Yes   • Partners: Male   • Birth control/protection: Post-menopausal, Surgical   ,          SCREENING TESTS    Year 2012   Age                         PAP                         HPV high risk                         Mammogram        benign                 STACEY score                         Breast MRI                         Lipids                         Vitamin D                         Colonoscopy                         DEXA  Frax (hip/any)                         Ovarian Screen                             No Additional Complaints Reported    The following portions of the patient's history were reviewed and updated as appropriate:vital signs and   She  has a past medical history of GERD (gastroesophageal reflux disease), Hyperlipidemia, Menopause, Osteoarthritis, Osteopenia, Rosacea, acne, Seasonal allergies, and Vaginal atrophy.  She does not have any pertinent problems on file.  She  has a  past surgical history that includes Hysterectomy; supracervical hysterectomy salpingo oophorectomy; Trigger finger release (Left); Tubal ligation; and Appendectomy.  Her family history includes Breast cancer in her mother; Cancer in her brother; Dementia in her father; Diabetes in her paternal grandmother; Stroke in her brother, father, paternal grandfather, and paternal grandmother.  She  reports that she has never smoked. She has never used smokeless tobacco. She reports that she does not drink alcohol or use drugs.  Current Outpatient Medications   Medication Sig Dispense Refill   • aspirin 81 MG EC tablet Take 81 mg by mouth daily.     • Azelaic Acid (FINACEA) 15 % foam Apply  topically.     • calcium carbonate-vitamin d (CALCIUM 600+D) 600-400 MG-UNIT per tablet Take 1 tablet by mouth Daily.     • estradiol (ESTRACE) 0.1 MG/GM vaginal cream estradiol 0.01% (0.1 mg/gram) vaginal cream     • famotidine (PEPCID) 40 MG tablet Take 40 mg by mouth daily.     • Hydrocortisone Ace-Pramoxine (ANALPRAM-HC RE) Analpram-HC     • Loratadine 10 MG capsule Take 1 tablet by mouth as needed.     • lovastatin (MEVACOR) 40 MG tablet Take 40 mg by mouth every night.     • Multiple Vitamin (DAILY VITAMIN FORMULA PO) Take 1 tablet by mouth daily.     • Multiple Vitamins-Minerals (CENTRUM ADULTS PO) Centrum     • polyethyl glycol-propyl glycol (SYSTANE) 0.4-0.3 % solution ophthalmic solution Administer 1 drop to both eyes daily.     • tretinoin (RETIN-A) 0.05 % cream Apply 1 application topically every night.       No current facility-administered medications for this visit.      She is allergic to levaquin [levofloxacin]; sulfa antibiotics; and sulfamethoxazole-trimethoprim..        Review of Systems  A comprehensive review of systems was done.  Constitutional: negative for fever, chills, activity change, appetite change, fatigue and unexpected weight change.  Respiratory: negative  Cardiovascular: negative  Gastrointestinal:  "negative  Genitourinary:negative  Musculoskeletal:negative  Behavioral/Psych: negative          /76   Ht 158.8 cm (62.5\")   Wt 58.9 kg (129 lb 12.8 oz)   LMP  (LMP Unknown) Comment: S/P LSH, BSO  Breastfeeding No   BMI 23.36 kg/m²     Physical Exam    General:  alert; cooperative; well developed; well nourished   Skin:  Not performed.   Thyroid: normal to inspection and palpation   Lungs:  clear to auscultation bilaterally   Heart:  regular rate and rhythm, S1, S2 normal, no murmur, click, rub or gallop   Breasts:  Not performed.   Abdomen: soft, non-tender; no masses  no umbilical or inguinal hernias are present  no hepato-splenomegaly   Pelvis: Clinical staff was present for exam  External genitalia:  normal appearance of the external genitalia including Bartholin's and [place]'s glands.  Vaginal:  normal pink mucosa without prolapse or lesions.  Cervix:  normal appearance.  Uterus:  absent.  Adnexa:  absent, bilateral.  Rectal:  anus visually normal appearing. recto-vaginal exam unremarkable and confirms findings;     Lab Review   No data reviewed    Imaging   Mammogram results    Medical counseling was given to patient for the following topics: diagnostic results, instructions for management, risk factor reductions, prognosis, impressions and risks and benefits of treatment options . Total time of the encounter was 18 minute(s) and 11 minute(s)  was spent in face to face counseling.  I have advised the patient that she does not have a cystocele or rectocele requiring surgical correction.  I have advised her that the estrogen cream is effectively treating vaginal atrophy.  I have advised the patient that her cervix is not prolapsed.  I have counseled her to continue using estradiol vaginal cream and a dose of 0.5 g intravaginally 3 times a week.  I have re--instructed her about inserting the applicator and a posterior rather than an anterior direction.  I have encouraged her to do Kegel's exercises " regularly.          ASSESSMENT  Problems Addressed this Visit        Genitourinary    Vaginal atrophy - Primary    Menopause           Substance History:    reports that she has never smoked. She has never used smokeless tobacco.    reports that she does not drink alcohol.    reports that she does not use drugs.    Substance use counseling is not indicated based on patient history.      PLAN    · Medications prescribed this encounter:  No orders of the defined types were placed in this encounter.  · Kegel's exercises daily, avoid heavy lifting and straining  · Continue using estradiol vaginal cream and a dose of 0.5 g intravaginally 3 times a week  · Follow up: 4 month(s) for AG  · Calcium, 600 mg/ Vit. D, 400 IU daily, regular, weight-bearing exercise, 4 days a week     *Please note that portions of this documentation may have been completed with a voice recognition program.  Efforts were made to edit this dictation, but occasional words may have been mistranscribed.     This note was electronically signed.    LEONARD Brown MD  March 10, 2020  14:47

## 2020-05-15 ENCOUNTER — APPOINTMENT (OUTPATIENT)
Dept: BONE DENSITY | Facility: HOSPITAL | Age: 70
End: 2020-05-15

## 2020-07-15 ENCOUNTER — OFFICE VISIT (OUTPATIENT)
Dept: OBSTETRICS AND GYNECOLOGY | Facility: CLINIC | Age: 70
End: 2020-07-15

## 2020-07-15 VITALS
DIASTOLIC BLOOD PRESSURE: 78 MMHG | BODY MASS INDEX: 22.86 KG/M2 | HEIGHT: 63 IN | WEIGHT: 129 LBS | SYSTOLIC BLOOD PRESSURE: 120 MMHG

## 2020-07-15 DIAGNOSIS — N95.2 VAGINAL ATROPHY: ICD-10-CM

## 2020-07-15 DIAGNOSIS — Z78.0 MENOPAUSE: Primary | ICD-10-CM

## 2020-07-15 DIAGNOSIS — Z80.3 FAMILY HISTORY OF MALIGNANT NEOPLASM OF BREAST: ICD-10-CM

## 2020-07-15 DIAGNOSIS — M85.851 OSTEOPENIA OF NECK OF RIGHT FEMUR: ICD-10-CM

## 2020-07-15 PROCEDURE — 99213 OFFICE O/P EST LOW 20 MIN: CPT | Performed by: OBSTETRICS & GYNECOLOGY

## 2020-07-15 NOTE — PROGRESS NOTES
Chief Complaint   Patient presents with   • Gynecologic Exam   • Med Refmelissa Dumont is a 70 y.o. year old  presenting to be seen because of follow-up for menopause with vaginal atrophy which is treated successfully with compounded estradiol cream, 0.1 mg/gram and a dose of 0.5 g intravaginally 3 times a week.  She has no side effects on this medication.  She has previously had an appendectomy, tubal sterilization, and a laparoscopic supracervical hysterectomy/bilateral salpingo-oophorectomy.  She was having difficulty inserting her estrogen cream last year but has corrected the direction of insertion.  She denies bowel or urinary symptoms.  She has mild osteopenia of the right femoral neck with the spine and hip being normal.  She has had no atraumatic fractures.    Social History     Substance and Sexual Activity   Sexual Activity Yes   • Partners: Male   • Birth control/protection: Post-menopausal, Surgical     SCREENING TESTS    Year 2012   Age                         PAP                         HPV high risk                         Mammogram        benign benign                STACEY score                         Breast MRI                         Lipids                         Vitamin D                         Colonoscopy                         DEXA  Frax (hip/any)      osteopenia                   Ovarian Screen                             No Additional Complaints Reported    The following portions of the patient's history were reviewed and updated as appropriate:vital signs and   She  has a past medical history of GERD (gastroesophageal reflux disease), Hyperlipidemia, Menopause, Osteoarthritis, Osteopenia, Rosacea, acne, Seasonal allergies, and Vaginal atrophy.  She does not have any pertinent problems on file.  She  has a past surgical history that includes Hysterectomy; supracervical  hysterectomy salpingo oophorectomy; Trigger finger release (Left); Tubal ligation; and Appendectomy.  Her family history includes Breast cancer in her mother; Cancer in her brother; Dementia in her father; Diabetes in her paternal grandmother; Stroke in her brother, father, paternal grandfather, and paternal grandmother.  She  reports that she has never smoked. She has never used smokeless tobacco. She reports that she does not drink alcohol or use drugs.  Current Outpatient Medications   Medication Sig Dispense Refill   • Unable to find 1 each 1 (One) Time. Compounded estradiol vaginal cream 0.1 mg/gm  0.5 gm by vaginal route 3 X week     • aspirin 81 MG EC tablet Take 81 mg by mouth daily.     • Azelaic Acid (FINACEA) 15 % foam Apply  topically.     • calcium carbonate-vitamin d (CALCIUM 600+D) 600-400 MG-UNIT per tablet Take 1 tablet by mouth Daily.     • famotidine (PEPCID) 40 MG tablet Take 40 mg by mouth daily.     • Hydrocortisone Ace-Pramoxine (ANALPRAM-HC RE) Analpram-HC     • Loratadine 10 MG capsule Take 1 tablet by mouth as needed.     • lovastatin (MEVACOR) 40 MG tablet Take 40 mg by mouth every night.     • Multiple Vitamin (DAILY VITAMIN FORMULA PO) Take 1 tablet by mouth daily.     • Multiple Vitamins-Minerals (CENTRUM ADULTS PO) Centrum     • polyethyl glycol-propyl glycol (SYSTANE) 0.4-0.3 % solution ophthalmic solution Administer 1 drop to both eyes daily.     • tretinoin (RETIN-A) 0.05 % cream Apply 1 application topically every night.       No current facility-administered medications for this visit.      She is allergic to levaquin [levofloxacin]; sulfa antibiotics; and sulfamethoxazole-trimethoprim..        Review of Systems  A review of systems was done.  She denies cough, fever, and shortness of breath.  Constitutional: negative for fever, chills, activity change, appetite change, fatigue and unexpected weight change.  Respiratory: negative  Cardiovascular: negative  Gastrointestinal:  "negative  Genitourinary:negative  Musculoskeletal:negative  Behavioral/Psych: negative          /78   Ht 158.8 cm (62.5\")   Wt 58.5 kg (129 lb)   LMP  (LMP Unknown) Comment: S/P LSH, BSO  Breastfeeding No   BMI 23.22 kg/m²     Physical Exam    General:  alert; cooperative; well developed; well nourished   Skin:  No suspicious lesions seen   Thyroid: normal to inspection and palpation   Lungs:  breathing is unlabored  clear to auscultation bilaterally   Heart:  regular rate and rhythm, S1, S2 normal, no murmur, click, rub or gallop   Breasts:  Examined in supine position  Symmetric without masses or skin dimpling  Nipples normal without inversion, lesions or discharge  There are no palpable axillary nodes   Abdomen: soft, non-tender; no masses  no umbilical or inguinal hernias are present  no hepato-splenomegaly   Pelvis: Clinical staff was present for exam  External genitalia:  normal appearance of the external genitalia including Bartholin's and [place]'s glands.  Vaginal:  normal pink mucosa without prolapse or lesions.  Cervix:  normal appearance.  Uterus:  absent.  Adnexa:  absent, bilateral.  Rectal:  anus visually normal appearing. recto-vaginal exam unremarkable and confirms findings;     Lab Review   No data reviewed    Imaging   Mammogram results and DEXA    Medical counseling was given to patient for the following topics: diagnostic results, instructions for management, risk factor reductions, impressions, risks and benefits of treatment options and importance of treatment compliance . Total time of the encounter was 18 minute(s) and 9 minute(s)  was spent in face to face counseling.  I have counseled the patient that the estradiol vaginal cream is effectively treating her vaginal atrophy.  I have reviewed with her appropriate instruction techniques.  I have counseled her in the need for continued self breast exam and annual breast imaging.  I have advised her in fall prevention.  I have counseled " her to have a repeat DEXA in September 2020.          ASSESSMENT  Problems Addressed this Visit        Musculoskeletal and Integument    Osteopenia       Genitourinary    Vaginal atrophy    Menopause - Primary       Other    Family history of malignant neoplasm of breast           Substance History:    reports that she has never smoked. She has never used smokeless tobacco.    reports that she does not drink alcohol.    reports that she does not use drugs.    Substance use counseling is not indicated based on patient history.      PLAN    · Medications prescribed this encounter:  No orders of the defined types were placed in this encounter.  · Compounded estradiol cream, 0.1 mg/gram and a dose of 0.5 g intravaginally 3 times a week  · Monthly self breast assessment and annual breast imaging  · DEXA in September 2020  · Follow up: 12 month(s)  · Calcium, 600 mg/ Vit. D, 400 IU daily, regular weight-bearing exercise 4 days a week     *Please note that portions of this documentation may have been completed with a voice recognition program.  Efforts were made to edit this dictation, but occasional words may have been mistranscribed.     This note was electronically signed.    LEONARD Brown MD  July 15, 2020  13:20

## 2020-09-03 ENCOUNTER — HOSPITAL ENCOUNTER (OUTPATIENT)
Dept: BONE DENSITY | Facility: HOSPITAL | Age: 70
Discharge: HOME | End: 2020-09-03
Admitting: OBSTETRICS & GYNECOLOGY

## 2020-09-03 DIAGNOSIS — M85.89 OSTEOPENIA OF MULTIPLE SITES: ICD-10-CM

## 2020-09-03 PROCEDURE — 77080 DXA BONE DENSITY AXIAL: CPT

## 2020-10-02 ENCOUNTER — TELEPHONE (OUTPATIENT)
Dept: OBSTETRICS AND GYNECOLOGY | Facility: CLINIC | Age: 70
End: 2020-10-02

## 2020-10-02 NOTE — TELEPHONE ENCOUNTER
Spoke with patient and advised her of her DEXA results.  Patient verbalized understanding and had no questions at this time.

## 2021-07-20 ENCOUNTER — OFFICE VISIT (OUTPATIENT)
Dept: OBSTETRICS AND GYNECOLOGY | Facility: CLINIC | Age: 71
End: 2021-07-20

## 2021-07-20 VITALS
HEIGHT: 63 IN | WEIGHT: 128.4 LBS | SYSTOLIC BLOOD PRESSURE: 120 MMHG | BODY MASS INDEX: 22.75 KG/M2 | DIASTOLIC BLOOD PRESSURE: 64 MMHG

## 2021-07-20 DIAGNOSIS — N95.2 VAGINAL ATROPHY: ICD-10-CM

## 2021-07-20 DIAGNOSIS — Z01.419 ENCOUNTER FOR GYNECOLOGICAL EXAMINATION WITHOUT ABNORMAL FINDING: Primary | ICD-10-CM

## 2021-07-20 DIAGNOSIS — Z78.0 MENOPAUSE: ICD-10-CM

## 2021-07-20 PROCEDURE — G0101 CA SCREEN;PELVIC/BREAST EXAM: HCPCS | Performed by: OBSTETRICS & GYNECOLOGY

## 2021-07-20 NOTE — PROGRESS NOTES
Chief Complaint   Patient presents with   • Gynecologic Exam   • Med Refill   • Abdominal Pain     left sided pain       Xiao Dumont is a 71 y.o. year old  presenting to be seen for her annual exam.  This patient has previously had a laparoscopic supracervical hysterectomy/bilateral salpingo-oophorectomy in  for multiple mural uterine leiomyomata.  She uses compounded estradiol cream, 0.1 mg/gram in a dose of 0.5 g intravaginally 3 times a week.  She has no side effects on this medication.  She has been having some left upper quadrant abdominal discomfort recently.  She denies constipation.  She denies urinary symptoms.  She has had Covid-19 immunization.    SCREENING TESTS    Year 2012   Age                         PAP                         HPV high risk                         Mammogram         benign benign               STACEY score                         Breast MRI                         Lipids                         Vitamin D                         Colonoscopy                         DEXA  Frax (hip/any)                         Ovarian Screen                             She exercises regularly: yes.  She wears her seat belt: yes.  She has concerns about domestic violence: no.  She has noticed changes in height: no    GYN screening history:  · Last mammogram: was done on approximately 2021 and the result was: Birads I (Normal)..    No Additional Complaints Reported    The following portions of the patient's history were reviewed and updated as appropriate:vital signs and   She  has a past medical history of GERD (gastroesophageal reflux disease), Hyperlipidemia, Menopause, Osteoarthritis, Osteopenia, Rosacea, acne, Seasonal allergies, and Vaginal atrophy.  She does not have any pertinent problems on file.  She  has a past surgical history that includes Hysterectomy; supracervical  hysterectomy salpingo oophorectomy; Trigger finger release (Left); Tubal ligation; and Appendectomy.  Her family history includes Breast cancer in her mother and niece; Cancer in her brother; Dementia in her father; Diabetes in her paternal grandmother; Stroke in her brother, father, paternal grandfather, and paternal grandmother.  She  reports that she has never smoked. She has never used smokeless tobacco. She reports that she does not drink alcohol and does not use drugs.  Current Outpatient Medications   Medication Sig Dispense Refill   • aspirin 81 MG EC tablet Take 81 mg by mouth daily.     • Azelaic Acid (FINACEA) 15 % foam Apply  topically.     • calcium carbonate-vitamin d (CALCIUM 600+D) 600-400 MG-UNIT per tablet Take 1 tablet by mouth Daily.     • famotidine (PEPCID) 40 MG tablet Take 40 mg by mouth daily.     • Hydrocortisone Ace-Pramoxine (ANALPRAM-HC RE) Analpram-HC     • Loratadine 10 MG capsule Take 1 tablet by mouth as needed.     • lovastatin (MEVACOR) 40 MG tablet Take 40 mg by mouth every night.     • Multiple Vitamin (DAILY VITAMIN FORMULA PO) Take 1 tablet by mouth daily.     • Multiple Vitamins-Minerals (CENTRUM ADULTS PO) Centrum     • polyethyl glycol-propyl glycol (SYSTANE) 0.4-0.3 % solution ophthalmic solution Administer 1 drop to both eyes daily.     • tretinoin (RETIN-A) 0.05 % cream Apply 1 application topically every night.     • Unable to find 1 each 1 (One) Time. Compounded estradiol vaginal cream 0.1 mg/gm  0.5 gm by vaginal route 3 X week       No current facility-administered medications for this visit.     She is allergic to levaquin [levofloxacin], sulfa antibiotics, and sulfamethoxazole-trimethoprim..    Review of Systems  A review of systems was taken.  Constitutional: negative for fever, chills, activity change, appetite change, fatigue and unexpected weight change.  Respiratory: negative  Cardiovascular: negative  Gastrointestinal: positive for abdominal  "pain  Genitourinary:negative  Musculoskeletal:negative  Behavioral/Psych: negative     Counseling/Anticipatory Guidance Discussed: nutrition, physical activity, healthy weight, injury prevention, immunizations, screenings and self-breast exam      /64   Ht 158.8 cm (62.5\")   Wt 58.2 kg (128 lb 6.4 oz)   LMP  (LMP Unknown) Comment: S/P LSH, BSO  Breastfeeding No   BMI 23.11 kg/m²     BMI reviewed     MEDICALLY INDICATED   Physical Exam    Neuro: alert and oriented to person, place and time    General:  alert; cooperative; well developed; well nourished   Skin:  No suspicious lesions seen   Thyroid: normal to inspection and palpation   Lungs:  breathing is unlabored  clear to auscultation bilaterally   Heart:  regular rate and rhythm, S1, S2 normal, no murmur, click, rub or gallop  normal apical impulse   Breasts:  Examined in supine position  Symmetric without masses or skin dimpling  Nipples normal without inversion, lesions or discharge  There are no palpable axillary nodes   Abdomen: soft, non-tender; no masses  no umbilical or inguinal hernias are present  no hepato-splenomegaly   Pelvis: Clinical staff was present for exam  External genitalia:  normal appearance of the external genitalia including Bartholin's and [place]'s glands.  Vaginal:  normal pink mucosa without prolapse or lesions.  Cervix:  normal appearance.  Uterus:  absent.  Adnexa:  absent, bilateral.  Rectal:  anus visually normal appearing. recto-vaginal exam unremarkable and confirms findings;     Lab Review   No data reviewed    Imaging  Mammogram results              ASSESSMENT  Problems Addressed this Visit        Genitourinary and Reproductive     Vaginal atrophy    Menopause      Other Visit Diagnoses     Encounter for gynecological examination without abnormal finding    -  Primary      Diagnoses       Codes Comments    Encounter for gynecological examination without abnormal finding    -  Primary ICD-10-CM: Z01.419  ICD-9-CM: " V72.31     Menopause     ICD-10-CM: Z78.0  ICD-9-CM: 627.2     Vaginal atrophy     ICD-10-CM: N95.2  ICD-9-CM: 627.3               Substance History:   reports that she has never smoked. She has never used smokeless tobacco.   reports no history of alcohol use.   reports no history of drug use.    Substance use counseling is not indicated based on patient history.      PLAN    · Medications prescribed this encounter:  No orders of the defined types were placed in this encounter.  · Monthly self breast assessment and annual breast imaging  · Compounded estradiol cream, 0.1 mg/gram in a dose of 0.5 g intravaginally 3 times a week  · Calcium, 600 mg/ Vit. D, 400 IU daily; regular weight-bearing exercise  · Follow up: 12 month(s)  *Please note that portions of this documentation may have been completed with a voice recognition program.  Efforts were made to edit this dictation, but occasional words may have been mistranscribed.       This note was electronically signed.    LEONARD Brown MD  July 20, 2021  12:05 EDT

## 2021-11-09 ENCOUNTER — TRANSCRIBE ORDERS (OUTPATIENT)
Dept: ADMINISTRATIVE | Facility: HOSPITAL | Age: 71
End: 2021-11-09

## 2021-11-09 DIAGNOSIS — R10.12 LEFT UPPER QUADRANT ABDOMINAL PAIN: Primary | ICD-10-CM

## 2021-12-01 ENCOUNTER — HOSPITAL ENCOUNTER (OUTPATIENT)
Dept: CT IMAGING | Facility: HOSPITAL | Age: 71
Discharge: HOME | End: 2021-12-01
Admitting: INTERNAL MEDICINE

## 2021-12-01 DIAGNOSIS — R10.12 LEFT UPPER QUADRANT ABDOMINAL PAIN: ICD-10-CM

## 2021-12-01 LAB — CREAT BLDA-MCNC: 0.7 MG/DL (ref 0.6–1.3)

## 2021-12-01 PROCEDURE — 82565 ASSAY OF CREATININE: CPT

## 2021-12-01 PROCEDURE — 74177 CT ABD & PELVIS W/CONTRAST: CPT

## 2021-12-01 PROCEDURE — 25010000002 IOPAMIDOL 61 % SOLUTION: Performed by: INTERNAL MEDICINE

## 2021-12-01 RX ADMIN — IOPAMIDOL 98 ML: 612 INJECTION, SOLUTION INTRAVENOUS at 15:33

## 2022-08-30 ENCOUNTER — TRANSCRIBE ORDERS (OUTPATIENT)
Dept: ADMINISTRATIVE | Facility: HOSPITAL | Age: 72
End: 2022-08-30

## 2022-08-30 DIAGNOSIS — Z13.9 SCREENING FOR UNSPECIFIED CONDITION: Primary | ICD-10-CM

## 2022-08-31 ENCOUNTER — TRANSCRIBE ORDERS (OUTPATIENT)
Dept: ADMINISTRATIVE | Facility: HOSPITAL | Age: 72
End: 2022-08-31

## 2022-08-31 DIAGNOSIS — Z78.0 OSTEOPENIA AFTER MENOPAUSE: Primary | ICD-10-CM

## 2022-08-31 DIAGNOSIS — Z13.820 SCREENING FOR OSTEOPOROSIS: Primary | ICD-10-CM

## 2022-08-31 DIAGNOSIS — Z13.9 SCREENING FOR UNSPECIFIED CONDITION: Primary | ICD-10-CM

## 2022-08-31 DIAGNOSIS — M85.80 OSTEOPENIA AFTER MENOPAUSE: Primary | ICD-10-CM

## 2022-09-06 ENCOUNTER — HOSPITAL ENCOUNTER (OUTPATIENT)
Dept: BONE DENSITY | Facility: HOSPITAL | Age: 72
Discharge: HOME | End: 2022-09-06
Admitting: NURSE PRACTITIONER

## 2022-09-06 DIAGNOSIS — Z78.0 OSTEOPENIA AFTER MENOPAUSE: ICD-10-CM

## 2022-09-06 DIAGNOSIS — M85.80 OSTEOPENIA AFTER MENOPAUSE: ICD-10-CM

## 2022-09-06 PROCEDURE — 77080 DXA BONE DENSITY AXIAL: CPT

## 2022-09-26 ENCOUNTER — PRE-ADMISSION TESTING (OUTPATIENT)
Dept: PREADMISSION TESTING | Facility: HOSPITAL | Age: 72
End: 2022-09-26

## 2022-09-26 VITALS — HEIGHT: 60 IN | WEIGHT: 131.5 LBS | BODY MASS INDEX: 25.82 KG/M2

## 2022-09-26 LAB
ANION GAP SERPL CALCULATED.3IONS-SCNC: 7 MMOL/L (ref 5–15)
BUN SERPL-MCNC: 15 MG/DL (ref 8–23)
BUN/CREAT SERPL: 24.2 (ref 7–25)
CALCIUM SPEC-SCNC: 9.5 MG/DL (ref 8.6–10.5)
CHLORIDE SERPL-SCNC: 105 MMOL/L (ref 98–107)
CO2 SERPL-SCNC: 31 MMOL/L (ref 22–29)
CREAT SERPL-MCNC: 0.62 MG/DL (ref 0.57–1)
DEPRECATED RDW RBC AUTO: 44.8 FL (ref 37–54)
EGFRCR SERPLBLD CKD-EPI 2021: 95.3 ML/MIN/1.73
ERYTHROCYTE [DISTWIDTH] IN BLOOD BY AUTOMATED COUNT: 13.2 % (ref 12.3–15.4)
GLUCOSE SERPL-MCNC: 119 MG/DL (ref 65–99)
HCT VFR BLD AUTO: 39.3 % (ref 34–46.6)
HGB BLD-MCNC: 12.6 G/DL (ref 12–15.9)
MCH RBC QN AUTO: 29.4 PG (ref 26.6–33)
MCHC RBC AUTO-ENTMCNC: 32.1 G/DL (ref 31.5–35.7)
MCV RBC AUTO: 91.8 FL (ref 79–97)
PLATELET # BLD AUTO: 267 10*3/MM3 (ref 140–450)
PMV BLD AUTO: 9.5 FL (ref 6–12)
POTASSIUM SERPL-SCNC: 4.2 MMOL/L (ref 3.5–5.2)
RBC # BLD AUTO: 4.28 10*6/MM3 (ref 3.77–5.28)
SODIUM SERPL-SCNC: 143 MMOL/L (ref 136–145)
WBC NRBC COR # BLD: 6.63 10*3/MM3 (ref 3.4–10.8)

## 2022-09-26 PROCEDURE — 36415 COLL VENOUS BLD VENIPUNCTURE: CPT

## 2022-09-26 PROCEDURE — 85027 COMPLETE CBC AUTOMATED: CPT

## 2022-09-26 PROCEDURE — 80048 BASIC METABOLIC PNL TOTAL CA: CPT

## 2022-09-26 RX ORDER — ESTRADIOL 0.1 MG/G
0.5 CREAM VAGINAL 3 TIMES WEEKLY
COMMUNITY

## 2022-09-26 RX ORDER — ATORVASTATIN CALCIUM 40 MG/1
40 TABLET, FILM COATED ORAL DAILY
COMMUNITY
Start: 2022-03-15

## 2022-09-26 NOTE — PAT
Patient to apply Chlorhexadine wipes  to surgical area (as instructed) the night before procedure and the AM of procedure. Wipes provided.    Consent signed.

## 2022-09-27 ENCOUNTER — ANESTHESIA EVENT (OUTPATIENT)
Dept: PERIOP | Facility: HOSPITAL | Age: 72
End: 2022-09-27

## 2022-09-28 ENCOUNTER — HOSPITAL ENCOUNTER (OUTPATIENT)
Facility: HOSPITAL | Age: 72
Setting detail: HOSPITAL OUTPATIENT SURGERY
Discharge: HOME | End: 2022-09-28
Attending: SURGERY | Admitting: SURGERY

## 2022-09-28 ENCOUNTER — ANESTHESIA EVENT CONVERTED (OUTPATIENT)
Dept: ANESTHESIOLOGY | Facility: HOSPITAL | Age: 72
End: 2022-09-28

## 2022-09-28 ENCOUNTER — ANESTHESIA (OUTPATIENT)
Dept: PERIOP | Facility: HOSPITAL | Age: 72
End: 2022-09-28

## 2022-09-28 VITALS
SYSTOLIC BLOOD PRESSURE: 113 MMHG | TEMPERATURE: 97.5 F | OXYGEN SATURATION: 100 % | RESPIRATION RATE: 18 BRPM | HEART RATE: 64 BPM | DIASTOLIC BLOOD PRESSURE: 61 MMHG

## 2022-09-28 DIAGNOSIS — K43.2 INCISIONAL HERNIA, WITHOUT OBSTRUCTION OR GANGRENE: Primary | ICD-10-CM

## 2022-09-28 PROCEDURE — 25010000002 DEXAMETHASONE SODIUM PHOSPHATE 10 MG/ML SOLUTION: Performed by: SURGERY

## 2022-09-28 PROCEDURE — 25010000002 PROPOFOL 10 MG/ML EMULSION: Performed by: ANESTHESIOLOGY

## 2022-09-28 PROCEDURE — C1781 MESH (IMPLANTABLE): HCPCS | Performed by: SURGERY

## 2022-09-28 PROCEDURE — 25010000002 CEFAZOLIN IN DEXTROSE 2-4 GM/100ML-% SOLUTION: Performed by: SURGERY

## 2022-09-28 PROCEDURE — 49654 PR LAP, INCISIONAL HERNIA REPAIR,REDUCIBLE: CPT | Performed by: PHYSICIAN ASSISTANT

## 2022-09-28 PROCEDURE — 25010000002 ONDANSETRON PER 1 MG: Performed by: ANESTHESIOLOGY

## 2022-09-28 PROCEDURE — 25010000002 FENTANYL CITRATE (PF) 50 MCG/ML SOLUTION: Performed by: ANESTHESIOLOGY

## 2022-09-28 DEVICE — VENTRALIGHT ST MESH WITH ECHO PS POSITONING SYSTEM
Type: IMPLANTABLE DEVICE | Site: ABDOMEN | Status: FUNCTIONAL
Brand: VENTRALIGHT ST MESH WITH ECHO PS POSITONING SYSTEM

## 2022-09-28 DEVICE — ABSORBABLE WOUND CLOSURE DEVICE
Type: IMPLANTABLE DEVICE | Site: ABDOMEN | Status: FUNCTIONAL
Brand: SYNETURE

## 2022-09-28 RX ORDER — EPHEDRINE SULFATE 50 MG/ML
INJECTION, SOLUTION INTRAVENOUS AS NEEDED
Status: DISCONTINUED | OUTPATIENT
Start: 2022-09-28 | End: 2022-09-28 | Stop reason: SURG

## 2022-09-28 RX ORDER — DOCUSATE SODIUM 100 MG/1
100 CAPSULE, LIQUID FILLED ORAL 2 TIMES DAILY PRN
Qty: 30 CAPSULE | Refills: 1 | Status: SHIPPED | OUTPATIENT
Start: 2022-09-28

## 2022-09-28 RX ORDER — PROPOFOL 10 MG/ML
VIAL (ML) INTRAVENOUS AS NEEDED
Status: DISCONTINUED | OUTPATIENT
Start: 2022-09-28 | End: 2022-09-28 | Stop reason: SURG

## 2022-09-28 RX ORDER — ONDANSETRON 2 MG/ML
4 INJECTION INTRAMUSCULAR; INTRAVENOUS ONCE AS NEEDED
Status: DISCONTINUED | OUTPATIENT
Start: 2022-09-28 | End: 2022-09-28 | Stop reason: HOSPADM

## 2022-09-28 RX ORDER — FENTANYL CITRATE 50 UG/ML
50 INJECTION, SOLUTION INTRAMUSCULAR; INTRAVENOUS
Status: DISCONTINUED | OUTPATIENT
Start: 2022-09-28 | End: 2022-09-28 | Stop reason: HOSPADM

## 2022-09-28 RX ORDER — ONDANSETRON 2 MG/ML
INJECTION INTRAMUSCULAR; INTRAVENOUS AS NEEDED
Status: DISCONTINUED | OUTPATIENT
Start: 2022-09-28 | End: 2022-09-28 | Stop reason: SURG

## 2022-09-28 RX ORDER — FENTANYL CITRATE 50 UG/ML
INJECTION, SOLUTION INTRAMUSCULAR; INTRAVENOUS AS NEEDED
Status: DISCONTINUED | OUTPATIENT
Start: 2022-09-28 | End: 2022-09-28 | Stop reason: SURG

## 2022-09-28 RX ORDER — SODIUM CHLORIDE, SODIUM LACTATE, POTASSIUM CHLORIDE, CALCIUM CHLORIDE 600; 310; 30; 20 MG/100ML; MG/100ML; MG/100ML; MG/100ML
9 INJECTION, SOLUTION INTRAVENOUS CONTINUOUS PRN
Status: DISCONTINUED | OUTPATIENT
Start: 2022-09-28 | End: 2022-09-28 | Stop reason: HOSPADM

## 2022-09-28 RX ORDER — MAGNESIUM HYDROXIDE 1200 MG/15ML
LIQUID ORAL AS NEEDED
Status: DISCONTINUED | OUTPATIENT
Start: 2022-09-28 | End: 2022-09-28 | Stop reason: HOSPADM

## 2022-09-28 RX ORDER — OXYCODONE HYDROCHLORIDE AND ACETAMINOPHEN 5; 325 MG/1; MG/1
1-2 TABLET ORAL EVERY 4 HOURS PRN
Qty: 10 TABLET | Refills: 0 | Status: SHIPPED | OUTPATIENT
Start: 2022-09-28

## 2022-09-28 RX ORDER — LIDOCAINE HYDROCHLORIDE 10 MG/ML
INJECTION, SOLUTION EPIDURAL; INFILTRATION; INTRACAUDAL; PERINEURAL AS NEEDED
Status: DISCONTINUED | OUTPATIENT
Start: 2022-09-28 | End: 2022-09-28 | Stop reason: SURG

## 2022-09-28 RX ORDER — DEXAMETHASONE SODIUM PHOSPHATE 10 MG/ML
INJECTION, SOLUTION INTRAMUSCULAR; INTRAVENOUS
Status: COMPLETED | OUTPATIENT
Start: 2022-09-28 | End: 2022-09-28

## 2022-09-28 RX ORDER — LABETALOL HYDROCHLORIDE 5 MG/ML
5 INJECTION, SOLUTION INTRAVENOUS
Status: DISCONTINUED | OUTPATIENT
Start: 2022-09-28 | End: 2022-09-28 | Stop reason: HOSPADM

## 2022-09-28 RX ORDER — HYDROMORPHONE HYDROCHLORIDE 1 MG/ML
0.25 INJECTION, SOLUTION INTRAMUSCULAR; INTRAVENOUS; SUBCUTANEOUS
Status: DISCONTINUED | OUTPATIENT
Start: 2022-09-28 | End: 2022-09-28 | Stop reason: HOSPADM

## 2022-09-28 RX ORDER — FAMOTIDINE 20 MG/1
20 TABLET, FILM COATED ORAL ONCE
Status: COMPLETED | OUTPATIENT
Start: 2022-09-28 | End: 2022-09-28

## 2022-09-28 RX ORDER — CEFAZOLIN SODIUM 2 G/100ML
2 INJECTION, SOLUTION INTRAVENOUS ONCE
Status: COMPLETED | OUTPATIENT
Start: 2022-09-28 | End: 2022-09-28

## 2022-09-28 RX ORDER — BUPIVACAINE HYDROCHLORIDE 2.5 MG/ML
INJECTION, SOLUTION EPIDURAL; INFILTRATION; INTRACAUDAL
Status: COMPLETED | OUTPATIENT
Start: 2022-09-28 | End: 2022-09-28

## 2022-09-28 RX ORDER — LIDOCAINE HYDROCHLORIDE 10 MG/ML
0.5 INJECTION, SOLUTION EPIDURAL; INFILTRATION; INTRACAUDAL; PERINEURAL ONCE AS NEEDED
Status: COMPLETED | OUTPATIENT
Start: 2022-09-28 | End: 2022-09-28

## 2022-09-28 RX ORDER — OXYCODONE HYDROCHLORIDE AND ACETAMINOPHEN 5; 325 MG/1; MG/1
TABLET ORAL
Status: DISCONTINUED
Start: 2022-09-28 | End: 2022-09-28 | Stop reason: HOSPADM

## 2022-09-28 RX ORDER — OXYCODONE HYDROCHLORIDE AND ACETAMINOPHEN 5; 325 MG/1; MG/1
1 TABLET ORAL ONCE AS NEEDED
Status: DISCONTINUED | OUTPATIENT
Start: 2022-09-28 | End: 2022-09-28 | Stop reason: HOSPADM

## 2022-09-28 RX ORDER — ROCURONIUM BROMIDE 10 MG/ML
INJECTION, SOLUTION INTRAVENOUS AS NEEDED
Status: DISCONTINUED | OUTPATIENT
Start: 2022-09-28 | End: 2022-09-28 | Stop reason: SURG

## 2022-09-28 RX ADMIN — PROPOFOL 150 MG: 10 INJECTION, EMULSION INTRAVENOUS at 07:39

## 2022-09-28 RX ADMIN — DEXAMETHASONE SODIUM PHOSPHATE 4 MG: 10 INJECTION, SOLUTION INTRAMUSCULAR; INTRAVENOUS at 07:40

## 2022-09-28 RX ADMIN — DEXAMETHASONE SODIUM PHOSPHATE 6 MG: 10 INJECTION, SOLUTION INTRAMUSCULAR; INTRAVENOUS at 07:53

## 2022-09-28 RX ADMIN — EPHEDRINE SULFATE 5 MG: 50 INJECTION INTRAVENOUS at 08:39

## 2022-09-28 RX ADMIN — BUPIVACAINE HYDROCHLORIDE 50 ML: 2.5 INJECTION, SOLUTION EPIDURAL; INFILTRATION; INTRACAUDAL at 07:40

## 2022-09-28 RX ADMIN — CEFAZOLIN SODIUM 2 G: 2 INJECTION, SOLUTION INTRAVENOUS at 07:36

## 2022-09-28 RX ADMIN — SODIUM CHLORIDE, POTASSIUM CHLORIDE, SODIUM LACTATE AND CALCIUM CHLORIDE 9 ML/HR: 600; 310; 30; 20 INJECTION, SOLUTION INTRAVENOUS at 07:02

## 2022-09-28 RX ADMIN — FAMOTIDINE 20 MG: 20 TABLET ORAL at 07:01

## 2022-09-28 RX ADMIN — SODIUM CHLORIDE, POTASSIUM CHLORIDE, SODIUM LACTATE AND CALCIUM CHLORIDE 9 ML/HR: 600; 310; 30; 20 INJECTION, SOLUTION INTRAVENOUS at 10:39

## 2022-09-28 RX ADMIN — ROCURONIUM BROMIDE 40 MG: 50 INJECTION, SOLUTION INTRAVENOUS at 07:39

## 2022-09-28 RX ADMIN — LIDOCAINE HYDROCHLORIDE 50 MG: 10 INJECTION, SOLUTION EPIDURAL; INFILTRATION; INTRACAUDAL; PERINEURAL at 07:39

## 2022-09-28 RX ADMIN — PROPOFOL 25 MCG/KG/MIN: 10 INJECTION, EMULSION INTRAVENOUS at 07:52

## 2022-09-28 RX ADMIN — OXYCODONE HYDROCHLORIDE AND ACETAMINOPHEN 1 TABLET: 5; 325 TABLET ORAL at 10:20

## 2022-09-28 RX ADMIN — ONDANSETRON 4 MG: 2 INJECTION INTRAMUSCULAR; INTRAVENOUS at 08:51

## 2022-09-28 RX ADMIN — SUGAMMADEX 200 MG: 100 INJECTION, SOLUTION INTRAVENOUS at 09:06

## 2022-09-28 RX ADMIN — ROCURONIUM BROMIDE 10 MG: 50 INJECTION, SOLUTION INTRAVENOUS at 08:23

## 2022-09-28 RX ADMIN — FENTANYL CITRATE 100 MCG: 50 INJECTION, SOLUTION INTRAMUSCULAR; INTRAVENOUS at 07:39

## 2022-09-28 RX ADMIN — LIDOCAINE HYDROCHLORIDE 0.5 ML: 10 INJECTION, SOLUTION EPIDURAL; INFILTRATION; INTRACAUDAL; PERINEURAL at 07:01

## 2022-09-28 NOTE — BRIEF OP NOTE
VENTRAL HERNIA REPAIR LAPAROSCOPIC WITH DAVINCI ROBOT  Progress Note    Xiao Dumont  9/28/2022    Pre-op Diagnosis:    Incisional hernia       Post-Op Diagnosis Codes:     * Incisional hernia [K43.2]    Procedure/CPT® Codes:        Procedure(s):  INCISIONAL HERNIA REPAIR W/ MESH LAPAROSCOPIC WITH DAVINCI ROBOT        Surgeon(s):  Bud Lambert MD    Anesthesia: General    Staff:   Circulator: Soo Elizalde RN; Maryuri León RN  Scrub Person: Kedar Jenkins  Nursing Assistant: Heather De Leon PCT  Assistant: Will Burleson PA  Assistant: Will Burleson PA      Estimated Blood Loss: minimal    Urine Voided: 125 mL    Specimens:                None          Drains:   Urethral Catheter Silicone 16 Fr. (Active)       Findings: Hernia at previous left lower quadrant incision reduced and repaired        Complications: none    Assistant: Will Burleson PA  was responsible for performing the following activities: Retraction, Suction, Suturing, Closing and Placing Dressing and their skilled assistance was necessary for the success of this case.    Bud Lambert MD     Date: 9/28/2022  Time: 09:13 EDT

## 2022-09-28 NOTE — ANESTHESIA PROCEDURE NOTES
"Four quadrant TAP blocks      Patient reassessed immediately prior to procedure    Patient location during procedure: OR  Reason for block: at surgeon's request and post-op pain management  Performed by  Assisted by: Domi Kohli CRNASRNA: Davon Weiner SRNA  Preanesthetic Checklist  Completed: patient identified, IV checked, site marked, risks and benefits discussed, surgical consent, monitors and equipment checked, pre-op evaluation and timeout performed  Prep:  Pt Position: supine  Sterile barriers:cap, gloves, mask and washed/disinfected hands  Prep: ChloraPrep  Patient monitoring: blood pressure monitoring, continuous pulse oximetry and EKG  Procedure    Sedation: no  Performed under: general  Guidance:ultrasound guided  Images:still images obtained, printed/placed on chart    Anesthesia laterality: Four quadrant.  Block Type:TAP  Injection Technique:single-shot  Needle Type:short-bevel and echogenic  Needle Gauge:20 G  Resistance on Injection: none    Medications Used: dexamethasone sodium phosphate injection, 4 mg  bupivacaine PF (MARCAINE) 0.25 % injection, 50 mL  Med administered at 9/28/2022 7:40 AM      Medications  Comment:Block Injection:  LA dose divided between Four quadrant TAP blocks      Post Assessment  Injection Assessment: negative aspiration for heme, incremental injection and no paresthesia on injection  Patient Tolerance:comfortable throughout block  Complications:no  Additional Notes  Subcostal TAPs  A high-frequency linear transducer, with sterile cover, was placed sub-xiphoid to identify [place], right and left Rectus Abdominus Muscles (WILLIE). The transducer was moved either right or left subcostally to identify the WILLIE and the Transverse Abdominus Muscle (FAROOQ). The insertion site was prepped in sterile fashion and then localized with 2-5 ml of 1% Lidocaine. Using ultrasound-guidance, a 20-gauge B-Palmer 4\" Ultraplex 360 non-stimulating echogenic needle was advanced in plane, " "from medial to lateral, until the tip of the needle was in the fascial plane between the WILLIE and FAROOQ. 1-3ml of preservative free normal saline was used to hydro-dissect the fascial planes. After the fascial plane was verified, the local anesthetic (LA) was injected. The procedure was repeated on the opposite side for bilateral coverage. Aspiration every 5 ml to prevent intravascular injection. Injection was completed with negative aspiration of blood and negative intravascular injection. Injection pressures were normal with minimal resistance. The subcostal approach to the TAP nerve block ideally anesthetizes the intercostal nerves T6-T9.     Mid-Axillary/Lateral TAPs  A high-frequency linear transducer, with sterile cover, was placed in the midaxillary line between the ASIS and costal margin. The External Oblique Muscle (EOM), Internal Oblique Muscle (IOM), Transverse Abdominus Muscle (FAROOQ), and Peritoneum were identified. The insertion site was prepped in sterile fashion and then localized with 2-5 ml of 1% Lidocaine. Using ultrasound-guidance, a 20-gauge B-Palmer 4\" Ultraplex 360 non-stimulating echogenic needle was advanced in plane, from medial to lateral, until the tip of the needle was in the fascial plane between the IOM and FAROOQ. 1-3ml of preservative free normal saline was used to hydro-dissect the fascial planes. After the fascial plane was verified, the local anesthetic (LA) was injected. The procedure was repeated on the opposite side for bilateral coverage. Aspiration every 5 ml to prevent intravascular injection. Injection was completed with negative aspiration of blood and negative intravascular injection. Injection pressures were normal with minimal resistance. Midaxillary TAPs should reach intercostal nerves T10- T11 and the subcostal nerve T12.             "

## 2022-09-28 NOTE — OP NOTE
General Surgery Operative Note    VENTRAL HERNIA REPAIR LAPAROSCOPIC WITH DAVINCI ROBOT  Procedure Report    Patient Name:  Xiao Dumont  YOB: 1950  [id number]     Date of Surgery:  9/28/2022       Pre-op Diagnosis: Incisional hernia    Post-op Diagnosis: Incisional hernia      Procedure(s):  INCISIONAL HERNIA REPAIR W/ MESH LAPAROSCOPIC WITH DAVINCI ROBOT    Surgeon: Bud Lambert MD    Assistant: Will Burleson PA     Anesthesia: General    Estimated Blood Loss: minimal    Complications: None     Implants:    Implant Name Type Inv. Item Serial No.  Lot No. LRB No. Used Action   DEV CLS WND VLOC/PBT ANGIE NONABS 1/2CIR SZ2/0 27MM 23CM EFRAIN - [id number] Implant DEV CLS WND VLOC/PBT ANGIE NONABS 1/2CIR SZ2/0 27MM 23CM EFRAIN  COVIDIEN [id number] N/A 3 Implanted   MESH VENTRALIGHT ST ECHO PS ELLIPSE 4X6IN - [id number] Implant MESH VENTRALIGHT ST ECHO PS ELLIPSE 4X6IN  DAVOL  (DIV OF CR Bloc) [id number] N/A 1 Implanted       Specimen:          None        Findings: Hernia at previous left lower quadrant incision reduced and repaired    Indications: Patient is a 72-year-old female with previous history of laparoscopic hysterectomy who developed a hernia at her left lower quadrant port site.  Discussion made with the patient about various treatment options and the patient found to be agreeable to laparoscopic incisional hernia repair with da Umesh robot.  Informed consent was obtained.    Description of Procedure:     After obtaining informed consent, the patient was taken to the operating room and placed in supine position. After appropriate DVT and antibiotic prophylaxis, general anesthesia was induced.  Tap blocks were performed by anesthesia.  A Da Silva catheter was placed under sterile conditions.  The abdomen was prepped and draped in standard sterile fashion.    A skin incision was made at [place]ov[place]'s point in the left upper quadrant of the abdomen using an 11 blade.  A 5 mm  laparoscopic port was placed into the peritoneal cavity using the Optiview technique.  The peritoneal cavity was then insufflated with carbon dioxide.  8 mm robotic ports were then placed under direct vision in the right lower quadrant and in the midline.  The 5 mm laparoscopic port was then removed and upsized to an 12 mm robotic port.  The patient was then placed in the Trendelenburg position.  The robot was then docked at the bedside.    The hernia was identified in the left lower quadrant of the abdominal wall containing omental fat.  This was reduced into the peritoneal cavity without incident.  The fascial defect was then reapproximated primarily using  2-0 VLoc suture. Mesh was then brought into the peritoneal cavity.  A small stab incision was made through the skin over the area of the hernia with an 11 blade.    The Lionel-Casie device was placed through the stab incisionband the cord of the positioning device was brought out through the skin and and secured at the skin level using hemostat.  The balloon from the positioning device was then insufflated with air.  The mesh was then secured circumferentially with running 2-0 Vloc stitch.  The cord from the positioning device was then cut at the skin level and the balloon was removed from the peritoneal cavity. The ports were then removed under direct vision.    The fascial defect from the 12 mm port site was reapproximated using an 0 Vicryl stitch and a suture passer.  The skin was reapproximated all sites using subcuticular 4-0 Monocryl.  Dry dressings were placed over each site.  An abdominal binder was placed.    All sponge and needle counts were correct times two at the completion of the procedure. The patient recovered from anesthesia, was extubated in the operating room and was transported to the PACU in stable condition.      Assistant: Will Burleson PA  was responsible for performing the following activities: Retraction, Suction, Suturing,  Closing and Placing Dressing and their skilled assistance was necessary for the success of this case.    Bud Lambert MD     Date: 9/28/2022  Time: 09:34 EDT

## 2022-09-28 NOTE — ANESTHESIA POSTPROCEDURE EVALUATION
Patient: Xiao Dumont    Procedure Summary     Date: 09/28/22 Room / Location:  [facility]    Anesthesia Start: 0732 Anesthesia Stop:     Procedure: INCISIONAL HERNIA REPAIR W/ MESH LAPAROSCOPIC WITH DAVINCI ROBOT (N/A Abdomen) Diagnosis: Incisional hernia    Surgeons: Bud Lambert MD Provider: Coy Ramos MD    Anesthesia Type: general ASA Status: 2          Anesthesia Type: general    Vitals  Vitals Value Taken Time   /52 09/28/22 0924   Temp 97.1 °F (36.2 °C) 09/28/22 0924   Pulse 57 09/28/22 0924   Resp 16 09/28/22 0924   SpO2 100 % 09/28/22 0925   Vitals shown include unvalidated device data.        Post Anesthesia Care and Evaluation    Patient location during evaluation: PACU  Patient participation: complete - patient cannot participate  Level of consciousness: responsive to physical stimuli  Pain score: 0  Pain management: adequate    Airway patency: patent  Anesthetic complications: No anesthetic complications    Cardiovascular status: stable  Respiratory status: acceptable, nasal cannula and spontaneous ventilation  Hydration status: stable    Comments: Pt transferred to PACU with O2. Vital signs stable. Report to PACU RN and care accepted.

## 2022-09-28 NOTE — ANESTHESIA POSTPROCEDURE EVALUATION
Patient: Xiao Dumont    Procedure Summary     Date: 09/28/22 Room / Location:  [facility]    Anesthesia Start: 0732 Anesthesia Stop: 0925    Procedure: INCISIONAL HERNIA REPAIR W/ MESH LAPAROSCOPIC WITH DAVINCI ROBOT (N/A Abdomen) Diagnosis: Incisional hernia    Surgeons: Bud Lambert MD Provider: Coy Ramos MD    Anesthesia Type: general ASA Status: 2          Anesthesia Type: general    Vitals  Vitals Value Taken Time   /52 09/28/22 0924   Temp 97.1 °F (36.2 °C) 09/28/22 0924   Pulse 57 09/28/22 0924   Resp 16 09/28/22 0924   SpO2 100 % 09/28/22 0925   Vitals shown include unvalidated device data.        Post Anesthesia Care and Evaluation    Patient location during evaluation: PACU  Patient participation: complete - patient cannot participate  Level of consciousness: responsive to physical stimuli  Pain score: 0  Pain management: adequate    Airway patency: patent  Anesthetic complications: No anesthetic complications    Cardiovascular status: stable  Respiratory status: acceptable, nasal cannula, oral airway and spontaneous ventilation  Hydration status: stable    Comments: Pt transferred to PACU with O2. Vital signs stable. Report to PACU RN and care accepted.

## 2022-09-28 NOTE — ANESTHESIA PROCEDURE NOTES
Airway  Urgency: elective    Date/Time: 9/28/2022 7:42 AM  Airway not difficult    General Information and Staff    Patient location during procedure: OR  CRNA/CAA: Hailey Spain CRNA    Indications and Patient Condition  Indications for airway management: airway protection    Preoxygenated: yes  MILS not maintained throughout  Mask difficulty assessment: 2 - vent by mask + OA or adjuvant +/- NMBA    Final Airway Details  Final airway type: endotracheal airway      Successful airway: ETT  Cuffed: yes   Successful intubation technique: direct laryngoscopy  Facilitating devices/methods: intubating stylet and cricoid pressure (Cricoid pressure for view)  Endotracheal tube insertion site: oral  Blade: Torres  Blade size: 2  ETT size (mm): 7.0  Cormack-Lehane Classification: grade IIa - partial view of glottis  Placement verified by: chest auscultation and capnometry   Cuff volume (mL): 5  Measured from: lips  ETT/EBT  to lips (cm): 23  Number of attempts at approach: 1  Assessment: lips, teeth, and gum same as pre-op and atraumatic intubation    Additional Comments  Negative epigastric sounds, Breath sound equal bilaterally with symmetric chest rise and fall

## 2022-09-28 NOTE — H&P
Pre-Op H&P  Xiao Dumont  [id number]  1950    Chief complaint: bulge noted in the abdominal area very close to a previous surgical incisional site    HPI:    Patient is a 72 y.o.female who presents with hernia noted at the area of a previous hysterectomy site     Review of Systems:  General ROS: negative for chills, fever or skin lesions;  No changes since last office visit.  Neg for recent sick exposure  Cardiovascular ROS: no chest pain or dyspnea on exertion  Respiratory ROS: no cough, shortness of breath, or wheezing    Allergies:   Allergies   Allergen Reactions   • Ciprofloxacin Rash   • Levaquin [Levofloxacin] Rash   • Sulfa Antibiotics Rash       Home Meds:    No current facility-administered medications on file prior to encounter.     Current Outpatient Medications on File Prior to Encounter   Medication Sig Dispense Refill   • Azelaic Acid (FINACEA) 15 % foam Apply 1 application topically to the appropriate area as directed Daily.     • calcium carbonate-vitamin d 600-400 MG-UNIT per tablet Take 0.5 tablets by mouth Daily.     • famotidine (PEPCID) 40 MG tablet Take 40 mg by mouth 2 (Two) Times a Day.     • Loratadine 10 MG capsule Take 1 tablet by mouth as needed.     • Multiple Vitamin (DAILY VITAMIN FORMULA PO) Take 1 tablet by mouth daily.     • polyethyl glycol-propyl glycol (SYSTANE) 0.4-0.3 % solution ophthalmic solution Administer 1 drop to both eyes daily.     • aspirin 81 MG EC tablet Take 81 mg by mouth Every Other Day.     • Hydrocortisone Ace-Pramoxine (ANALPRAM-HC RE) Analpram-HC     • lovastatin (MEVACOR) 40 MG tablet Take 40 mg by mouth every night.     • Multiple Vitamins-Minerals (CENTRUM ADULTS PO) Take 1 tablet by mouth Daily.     • tretinoin (RETIN-A) 0.05 % cream Apply 1 application topically to the appropriate area as directed Every Other Day.     • Unable to find 1 each 1 (One) Time. Compounded estradiol vaginal cream 0.1 mg/gm  0.5 gm by vaginal route 3 X week          PMH:   Past Medical History:   Diagnosis Date   • GERD (gastroesophageal reflux disease)    • Hyperlipidemia    • Menopause    • Osteoarthritis    • Osteopenia    • Rosacea, acne    • Seasonal allergies    • Vaginal atrophy      PSH:    Past Surgical History:   Procedure Laterality Date   • APPENDECTOMY  1967   • COLONOSCOPY     • ENDOSCOPY     • HYSTERECTOMY     • SUPRACERVICAL HYSTERECTOMY SALPINGO OOPHORECTOMY     • TRIGGER FINGER RELEASE Left    • TUBAL ABDOMINAL LIGATION         Immunization History:  Influenza: no  Pneumococcal: yes  Tetanus: yes    Social History:   Tobacco:   Social History     Tobacco Use   Smoking Status Never Smoker   Smokeless Tobacco Never Used      Alcohol:     Social History     Substance and Sexual Activity   Alcohol Use No       Vitals:           /76 (BP Location: Right arm, Patient Position: Lying)   Pulse 59   Temp 98.2 °F (36.8 °C) (Temporal)   Resp 18   LMP  (LMP Unknown) Comment: S/P LSH, BSO  SpO2 100%     Physical Exam:  General Appearance:    Alert, cooperative, no distress, appears stated age   Head:    Normocephalic, without obvious abnormality, atraumatic   Lungs:     Clear to auscultation bilaterally, respirations unlabored    Heart:   Regular rate and rhythm, S1 and S2 normal, no murmur, rub    or gallop    Abdomen:    Soft, nontender.  +bowel sounds   Breast Exam:    deferred   Genitalia:    deferred   Extremities:   Extremities normal, atraumatic, no cyanosis or edema   Skin:   Skin color, texture, turgor normal, no rashes or lesions   Neurologic:   Grossly intact   Results Review  LABS:  Lab Results   Component Value Date    WBC 6.63 09/26/2022    HGB 12.6 09/26/2022    HCT 39.3 09/26/2022    MCV 91.8 09/26/2022     09/26/2022    GLUCOSE 119 (H) 09/26/2022    BUN 15 09/26/2022    CREATININE 0.62 09/26/2022     09/26/2022    K 4.2 09/26/2022     09/26/2022    CO2 31.0 (H) 09/26/2022    CALCIUM 9.5 09/26/2022  "      RADIOLOGY:  Peripheral Block    Result Date: 9/28/2022  Davon Weiner SRNA     9/28/2022  6:21 AM Peripheral Block Patient reassessed immediately prior to procedure Patient location during procedure: OR Reason for block: at surgeon's request and post-op pain management Preanesthetic Checklist Completed: patient identified, IV checked, site marked, risks and benefits discussed, surgical consent, monitors and equipment checked, pre-op evaluation and timeout performed Prep: Pt Position: supine Sterile barriers:cap, gloves, mask and washed/disinfected hands Prep: ChloraPrep Patient monitoring: blood pressure monitoring, continuous pulse oximetry and EKG Procedure Sedation: yes Performed under: general Guidance:ultrasound guided Images:still images obtained, printed/placed on chart Laterality:Bilateral Block Type:TAP Injection Technique:single-shot Needle Type:short-bevel and echogenic Needle Gauge:20 G Resistance on Injection: none Medications Comment:Block Injection:  LA dose divided between Right and Left block Post Assessment Injection Assessment: negative aspiration for heme, incremental injection and no paresthesia on injection Patient Tolerance:comfortable throughout block Complications:no Additional Notes Subcostal TAPs A high-frequency linear transducer, with sterile cover, was placed sub-xiphoid to identify [place], right and left Rectus Abdominus Muscles (WILLIE). The transducer was moved either right or left subcostally to identify the WILLIE and the Transverse Abdominus Muscle (FAROOQ). The insertion site was prepped in sterile fashion and then localized with 2-5 ml of 1% Lidocaine. Using ultrasound-guidance, a 20-gauge B-Palmer 4\" Ultraplex 360 non-stimulating echogenic needle was advanced in plane, from medial to lateral, until the tip of the needle was in the fascial plane between the WILLIE and FAROOQ. 1-3ml of preservative free normal saline was used to hydro-dissect the fascial planes. After the fascial " "plane was verified, the local anesthetic (LA) was injected. The procedure was repeated on the opposite side for bilateral coverage. Aspiration every 5 ml to prevent intravascular injection. Injection was completed with negative aspiration of blood and negative intravascular injection. Injection pressures were normal with minimal resistance. The subcostal approach to the TAP nerve block ideally anesthetizes the intercostal nerves T6-T9. Mid-Axillary/Lateral TAPs A high-frequency linear transducer, with sterile cover, was placed in the midaxillary line between the ASIS and costal margin. The External Oblique Muscle (EOM), Internal Oblique Muscle (IOM), Transverse Abdominus Muscle (FAROOQ), and Peritoneum were identified. The insertion site was prepped in sterile fashion and then localized with 2-5 ml of 1% Lidocaine. Using ultrasound-guidance, a 20-gauge B-Palmer 4\" Ultraplex 360 non-stimulating echogenic needle was advanced in plane, from medial to lateral, until the tip of the needle was in the fascial plane between the IOM and FAROOQ. 1-3ml of preservative free normal saline was used to hydro-dissect the fascial planes. After the fascial plane was verified, the local anesthetic (LA) was injected. The procedure was repeated on the opposite side for bilateral coverage. Aspiration every 5 ml to prevent intravascular injection. Injection was completed with negative aspiration of blood and negative intravascular injection. Injection pressures were normal with minimal resistance. Midaxillary TAPs should reach intercostal nerves T10- T11 and the subcostal nerve T12.        I reviewed the patient's new clinical results.    Cancer Staging (if applicable)  Cancer Patient: __ yes __no __unknown; If yes, clinical stage T:__ N:__M:__, stage group or __N/A    Impression: 72 year old female presenting with an abdominal incisional hernia    Plan: laparoscopic incisional hernia repair with mesh      ADWOA Fan   09/28/22   6:39 AM " EDT

## 2022-09-28 NOTE — ANESTHESIA PREPROCEDURE EVALUATION
Anesthesia Evaluation     Patient summary reviewed and Nursing notes reviewed                Airway   Mallampati: II  TM distance: >3 FB  Neck ROM: full  No difficulty expected  Dental - normal exam     Pulmonary - negative pulmonary ROS and normal exam   Cardiovascular - normal exam    (+) hyperlipidemia,       Neuro/Psych- negative ROS  GI/Hepatic/Renal/Endo    (+)  GERD,      Musculoskeletal (-) negative ROS    Abdominal  - normal exam    Bowel sounds: normal.   Substance History - negative use     OB/GYN negative ob/gyn ROS         Other                      Anesthesia Plan    ASA 2     general     intravenous induction     Anesthetic plan, risks, benefits, and alternatives have been provided, discussed and informed consent has been obtained with: patient.    Plan discussed with CRNA.        CODE STATUS:

## 2022-10-12 ENCOUNTER — TRANSCRIBE ORDERS (OUTPATIENT)
Dept: ADMINISTRATIVE | Facility: HOSPITAL | Age: 72
End: 2022-10-12

## 2022-10-12 DIAGNOSIS — R74.01 NONSPECIFIC ELEVATION OF LEVELS OF TRANSAMINASE OR LACTIC ACID DEHYDROGENASE (LDH): Primary | ICD-10-CM

## 2022-10-12 DIAGNOSIS — R74.02 NONSPECIFIC ELEVATION OF LEVELS OF TRANSAMINASE OR LACTIC ACID DEHYDROGENASE (LDH): Primary | ICD-10-CM

## 2022-10-13 ENCOUNTER — HOSPITAL ENCOUNTER (OUTPATIENT)
Dept: CT IMAGING | Facility: HOSPITAL | Age: 72
Discharge: HOME | End: 2022-10-13
Admitting: NURSE PRACTITIONER

## 2022-10-13 DIAGNOSIS — R74.02 NONSPECIFIC ELEVATION OF LEVELS OF TRANSAMINASE OR LACTIC ACID DEHYDROGENASE (LDH): ICD-10-CM

## 2022-10-13 DIAGNOSIS — R74.01 NONSPECIFIC ELEVATION OF LEVELS OF TRANSAMINASE OR LACTIC ACID DEHYDROGENASE (LDH): ICD-10-CM

## 2022-10-13 PROCEDURE — 74177 CT ABD & PELVIS W/CONTRAST: CPT

## 2022-10-13 PROCEDURE — 25010000002 IOPAMIDOL 61 % SOLUTION: Performed by: NURSE PRACTITIONER

## 2022-10-13 RX ADMIN — IOPAMIDOL 85 ML: 612 INJECTION, SOLUTION INTRAVENOUS at 15:10

## 2024-01-11 NOTE — PROGRESS NOTES
Subjective   Chief Complaint   Patient presents with    Annual Exam     Tenderness right breast Mammogram this week  WNL (Dr. Clark thinks it is from her Estradiol     Xiao Dumont is a 73 y.o. year old  who is post-menopausal.  She is S/P supracervical hysterectomy/bilateral salpingo-oophorectomy in  for multiple mural uterine leiomyomata, presenting to be seen for her annual exam.  She reports months long right breast pain, and had a diagnostic mammogram and US this week that was normal. She was previously on a pill for HRT decades ago, and has been on the vaginal estrogen cream for many, many years. She also reports some slight ROBERT symptoms that are not bothersome.     This past year she has not been on hormone replacement therapy, but has been using vaginal estrogen cream.  There has not been vaginal bleeding in the last 12 months.  Menopausal symptoms are not present.    Up-to-date on DEXA, mammogram and colonoscopy    SEXUAL Hx:  She is not currently sexually active.    HEALTH Hx:  She exercises regularly: yes. Walking and morning stretches.   She wears her seat belt: yes.  She has concerns about domestic violence: no.    The following portions of the patient's history were reviewed and updated as appropriate:problem list, current medications, allergies, past family history, past medical history, past social history, and past surgical history.    Social History    Tobacco Use      Smoking status: Never      Smokeless tobacco: Never         Objective   /64   Wt 62.4 kg (137 lb 9.6 oz)   LMP  (LMP Unknown) Comment: S/P LSH, BSO  BMI 26.87 kg/m²     General:  well developed; well nourished  no acute distress   Skin:  No suspicious lesions seen   Breasts:  Examined in supine position  Symmetric without masses or skin dimpling  Nipples normal without inversion, lesions or discharge  There are no palpable axillary nodes   Pelvis: Clinical staff was present for exam  External genitalia:   normal appearance of the external genitalia including Bartholin's and Stafford's glands.  :  urethral meatus normal;  Vaginal:  normal pink mucosa without prolapse or lesions.  Cervix:  normal appearance.  Uterus:  absent.  Adnexa:  absent, bilateral.  Rectal:  digital rectal exam not performed; anus visually normal appearing.        Assessment   Normal GYN exam s/p supracervical hysterectomy  Vaginal atrophy  Right breast pain, resolved  Non bothersome ROBERT   Osteopenia, continue PCP management  Menopausal female currently not on HRT - without significant symptoms affecting activities of daily living     Plan   Pap was not done today.  I explained to Xiao that the Pap smears are no longer recommended in patient's after 65 years of age.   I stressed to Xiao that she still should be seen to be seen yearly for a full physical including breast and pelvic exam.   She was encouraged to get yearly mammograms.  She should report any palpable breast lump(s) or skin changes regardless of mammographic findings.  I explained to Xiao that notification regarding her mammogram results will come from the center performing the study.  Our office will not be routinely calling with mammogram results.  It is her responsibility to make sure that the results from the mammogram are communicated to her by the breast center.  If she has any questions about the results, she is welcome to call our office anytime.  Discussed very low likelihood that vaginal estrogen cream is causing right breast pain.  As she has had a normal diagnostic mammogram and ultrasound, recommend observation.  Patient would like to continue the vaginal estrogen cream and will call when she needs a refill.  The importance of keeping all planned follow-up and taking all medications as prescribed was emphasized.  Follow up for annual exam in 2 years or sooner PRN    No orders of the defined types were placed in this encounter.         This note was electronically  signed.    Windy Corbett MD   January 12, 2024

## 2024-01-12 ENCOUNTER — OFFICE VISIT (OUTPATIENT)
Dept: OBSTETRICS AND GYNECOLOGY | Facility: CLINIC | Age: 74
End: 2024-01-12
Payer: MEDICARE

## 2024-01-12 VITALS — WEIGHT: 137.6 LBS | BODY MASS INDEX: 26.87 KG/M2 | SYSTOLIC BLOOD PRESSURE: 132 MMHG | DIASTOLIC BLOOD PRESSURE: 64 MMHG

## 2024-01-12 DIAGNOSIS — N39.3 STRESS INCONTINENCE: ICD-10-CM

## 2024-01-12 DIAGNOSIS — N95.2 VAGINAL ATROPHY: ICD-10-CM

## 2024-01-12 DIAGNOSIS — Z01.419 WOMEN'S ANNUAL ROUTINE GYNECOLOGICAL EXAMINATION: Primary | ICD-10-CM

## 2024-02-21 ENCOUNTER — TELEPHONE (OUTPATIENT)
Dept: OBSTETRICS AND GYNECOLOGY | Facility: CLINIC | Age: 74
End: 2024-02-21
Payer: MEDICARE

## 2024-02-21 RX ORDER — METRONIDAZOLE 500 MG/1
500 TABLET ORAL 2 TIMES DAILY
Qty: 14 TABLET | Refills: 0 | Status: SHIPPED | OUTPATIENT
Start: 2024-02-21 | End: 2024-02-28

## 2024-02-21 NOTE — TELEPHONE ENCOUNTER
Patient advised prescription called into pharmacy for possible BV.  If symptoms do not improve after treatment, an appointment for vaginal swab and exam maybe needed

## 2024-02-21 NOTE — TELEPHONE ENCOUNTER
Caller: Xiao Dumont    Relationship to patient: Self    Best call back number: [phone] (home)  CAN CALL BACK       MILD BURNING, ODOR, AND WETNESS FOR ABOUT FOUR WEEKS.             [id number] - [phone]  - [phone] [facility] [phone]

## 2024-02-21 NOTE — TELEPHONE ENCOUNTER
Flagyl BID x 7 days sent to listed pharmacy for possible BV. If symptoms do not improve after treatment, recommend coming in for vaginal swab and exam.   Windy Corbett MD

## 2024-02-21 NOTE — TELEPHONE ENCOUNTER
Called and spoke with patient.  She states that she has been having an abnormal odor (fishy in quality), some vaginal irritation, and over all increased moisture vaginally for last four weeks approximately.  No abnormal vaginal discharge, no urinary changes.She has not tried anything over the counter.  She would like to know if something could be called in for her, confirmed pharmacy on file.

## 2024-02-29 ENCOUNTER — TELEPHONE (OUTPATIENT)
Dept: OBSTETRICS AND GYNECOLOGY | Facility: CLINIC | Age: 74
End: 2024-02-29
Payer: MEDICARE

## 2024-02-29 NOTE — TELEPHONE ENCOUNTER
High pt:    Patient called, stating she is still having itchiness, wetness and she does not think the medication is working. Patient is concerned.

## 2024-02-29 NOTE — PROGRESS NOTES
"Subjective   Chief Complaint   Patient presents with    Vaginal Itching     Xiao Dumont is a 73 y.o. year old .  No LMP recorded (lmp unknown). Patient has had a hysterectomy.  She presents for persistent vaginal itching and irritation, unresponsive to Flagyl BID x7 days. She reports the only changes since her last visit is a dental hygiene appointment and new estrace vaginal estrogen cream. She changed laundry detergents at the same time this started. Denies bleeding or rashes in the area. Also denies changes in urinary leakage or incontinence frequency or amount in this time.     The following portions of the patient's history were reviewed and updated as appropriate:current medications, allergies, and past medical history    Social History    Tobacco Use      Smoking status: Never      Smokeless tobacco: Never         Objective   /64 (BP Location: Right arm, Patient Position: Sitting, Cuff Size: Adult)   Ht 152.4 cm (60\")   Wt 62.1 kg (137 lb)   LMP  (LMP Unknown) Comment: S/P LSH, BSO  BMI 26.76 kg/m²     General:  well developed; well nourished  no acute distress   Lungs:  breathing is unlabored   Heart:  Not performed.   Pelvis: Clinical staff was present for exam  External genitalia:  normal appearance of the external genitalia including Bartholin's and [place]'s glands. Bilateral erythema with white areas of skin flaking noted between labia; introitus with foul smelling, colorless discharge  :  urethral meatus normal;  Vaginal:  atrophic mucosal changes are present;  Cervix:  normal appearance.     Lab Review   No data reviewed    Imaging   No data reviewed        Assessment   Contact dermatitis vs yeast infection      Plan   Leukorrhea swab collected.  Will call patient with any abnormal results, and treat accordingly.  Will empirically treat for contact dermatitis versus yeast infection due to symptoms and recent change in laundry detergent.  One-time dose of Diflucan sent to listed " pharmacy, along with a 4-week taper of Kenalog cream.  Recommend switching back to original laundry detergent, and avoiding sitz bath's at this time.  Patient to call should symptoms worsen during the 4-week Kenalog taper.  The importance of keeping all planned follow-up and taking all medications as prescribed was emphasized.  Follow up 4 weeks for repeat exam and symptom check or sooner PRN     New Medications Ordered This Visit   Medications    fluconazole (Diflucan) 200 MG tablet     Sig: Take 1 tablet by mouth 1 (One) Time for 1 dose.     Dispense:  1 tablet     Refill:  0    triamcinolone (KENALOG) 0.5 % cream     Sig: Apply 1 Application topically to the appropriate area as directed 2 (Two) Times a Day. For 1 week, followed by 1x day for 1 week, followed by every other day for 1 week, then 2x a week only. Total taper time: 4 weeks, then stop.     Dispense:  15 g     Refill:  0          This note was electronically signed.    Windy Corbett MD   March 1, 2024

## 2024-02-29 NOTE — TELEPHONE ENCOUNTER
Please get her in for an appointment for evaluation and swab. Can be with me or any CNM/APRN. Thanks,  Windy Corbett MD

## 2024-03-01 ENCOUNTER — OFFICE VISIT (OUTPATIENT)
Dept: OBSTETRICS AND GYNECOLOGY | Facility: CLINIC | Age: 74
End: 2024-03-01
Payer: MEDICARE

## 2024-03-01 VITALS
WEIGHT: 137 LBS | DIASTOLIC BLOOD PRESSURE: 64 MMHG | HEIGHT: 60 IN | SYSTOLIC BLOOD PRESSURE: 106 MMHG | BODY MASS INDEX: 26.9 KG/M2

## 2024-03-01 DIAGNOSIS — L24.0 IRRITANT CONTACT DERMATITIS DUE TO DETERGENT: ICD-10-CM

## 2024-03-01 DIAGNOSIS — N89.8 VAGINAL ITCHING: Primary | ICD-10-CM

## 2024-03-01 RX ORDER — TRIAMCINOLONE ACETONIDE 5 MG/G
1 CREAM TOPICAL 2 TIMES DAILY
Qty: 15 G | Refills: 0 | Status: SHIPPED | OUTPATIENT
Start: 2024-03-01

## 2024-03-01 RX ORDER — FLUCONAZOLE 200 MG/1
200 TABLET ORAL ONCE
Qty: 1 TABLET | Refills: 0 | Status: SHIPPED | OUTPATIENT
Start: 2024-03-01 | End: 2024-03-01

## 2024-04-04 NOTE — PROGRESS NOTES
Subjective   Chief Complaint   Patient presents with    Follow-up     Dermatitis & steroid cream check / still has some slight irritation and an odor     Xiao Dumont is a 73 y.o. year old .  No LMP recorded (lmp unknown). Patient has had a hysterectomy.  She presents for 4 week follow up of contact dermatitis. She reports some slight irritation (slight burning) and odor today, but is overall 95% better than her last visit. She reports compliance with the Kenalog cream that initially burned, but is now improved. Denies changes in urinary incontinence (only with sneezing).     The following portions of the patient's history were reviewed and updated as appropriate:current medications, allergies, and past medical history    Social History    Tobacco Use      Smoking status: Never        Passive exposure: Never      Smokeless tobacco: Never         Objective   /70 (BP Location: Right arm, Patient Position: Sitting, Cuff Size: Adult)   Wt 62.1 kg (137 lb)   LMP  (LMP Unknown) Comment: S/P LSH, BSO  BMI 26.76 kg/m²     General:  well developed; well nourished  no acute distress   Lungs:  breathing is unlabored   Heart:  Not performed.   Pelvis: Clinical staff was present for exam  External genitalia:  normal appearance of the external genitalia including Bartholin's and [place]'s glands. Markedly improved labial erythema, minimally present bilaterally on the labia majora and labial folds   :  urethral meatus normal;     Lab Review   No data reviewed    Imaging   No data reviewed        Assessment   Labial contact dermatitis, improved      Plan   Patient with markedly improved clinical exam. Recommend continuing the Kenalog cream an additional 4 weeks for complete resolution of symptoms, and will do one more evaluation at that time.  Patient voiced understanding, and all questions answered to the best my ability.  The importance of keeping all planned follow-up and taking all medications as prescribed  was emphasized.  Follow up 4 weeks or sooner PRN     No orders of the defined types were placed in this encounter.         This note was electronically signed.    Windy Corebtt MD   April 5, 2024

## 2024-04-05 ENCOUNTER — OFFICE VISIT (OUTPATIENT)
Dept: OBSTETRICS AND GYNECOLOGY | Facility: CLINIC | Age: 74
End: 2024-04-05
Payer: MEDICARE

## 2024-04-05 VITALS — BODY MASS INDEX: 26.76 KG/M2 | DIASTOLIC BLOOD PRESSURE: 70 MMHG | SYSTOLIC BLOOD PRESSURE: 120 MMHG | WEIGHT: 137 LBS

## 2024-04-05 DIAGNOSIS — L24.0 IRRITANT CONTACT DERMATITIS DUE TO DETERGENT: Primary | ICD-10-CM

## 2024-04-05 RX ORDER — FLUCONAZOLE 200 MG/1
200 TABLET ORAL
COMMUNITY
Start: 2024-03-01

## 2024-04-15 RX ORDER — ESTRADIOL 0.1 MG/G
2 CREAM VAGINAL 2 TIMES WEEKLY
Qty: 42.5 G | Refills: 11 | Status: SHIPPED | OUTPATIENT
Start: 2024-04-15 | End: 2024-04-16 | Stop reason: SDUPTHER

## 2024-04-15 NOTE — TELEPHONE ENCOUNTER
I can not tell when her last refill was. It is how ever listed under medication with unknown date. Please advise

## 2024-04-16 ENCOUNTER — TELEPHONE (OUTPATIENT)
Dept: OBSTETRICS AND GYNECOLOGY | Facility: CLINIC | Age: 74
End: 2024-04-16
Payer: MEDICARE

## 2024-04-16 RX ORDER — ESTRADIOL 0.1 MG/G
2 CREAM VAGINAL 2 TIMES WEEKLY
Qty: 42.5 G | Refills: 11 | Status: SHIPPED | OUTPATIENT
Start: 2024-04-18

## 2024-04-16 NOTE — TELEPHONE ENCOUNTER
High pt called stating her prescription for estradiol cream was sent to the wrong pharmacy and is needing it sent to [facility]. Please advise when prescription has been sent

## 2024-04-16 NOTE — TELEPHONE ENCOUNTER
Patient states she is using 0.5 grams 3 times a week.  The pharmacist has filled as previously written but she will continue to use 0.5 grams 3 times a week.  She would like for her chart to reflect this for future refills

## 2024-04-16 NOTE — TELEPHONE ENCOUNTER
----- Message from Windy Corbett MD sent at 4/16/2024 11:45 AM EDT -----  Regarding: FW: Estradiol dosage   Contact: [phone]  That is just the standard dose that auto populates. Can we change that to the dose she takes and send it back over for her? Thanks   Windy Corbett MD  ----- Message -----  From: Cynthia Ardon MA  Sent: 4/16/2024  11:39 AM EDT  To: Windy Corbett MD  Subject: FW: Estradiol dosage                               ----- Message -----  From: Xiao Dumont  Sent: 4/16/2024  11:26 AM EDT  To: [facility] Clinical [facility]  Subject: Estradiol dosage                                 [facility] pharmacist just called to tell me that my new prescription was not for the same dosage that I have been using. The prescription was for   2 grams/twice a week. I have been on .5 grams 3 times a week from many years.  I would be afraid to use that increased amount. Please advise me.

## 2024-05-01 NOTE — PROGRESS NOTES
Subjective   Chief Complaint   Patient presents with    Follow-up     Med check: Kenalog cream, pt states that she is better.     Xiao Dumont is a 73 y.o. year old .  No LMP recorded (lmp unknown). Patient has had a hysterectomy.  She presents for 4 week follow up on contact dermatitis and Kenalog cream use. She reports feeling much better, but did have some labial burning with urination.     The following portions of the patient's history were reviewed and updated as appropriate:current medications, allergies, and past medical history    Social History    Tobacco Use      Smoking status: Never        Passive exposure: Never      Smokeless tobacco: Never         Objective   /62 (BP Location: Right arm, Patient Position: Sitting, Cuff Size: Adult)   Pulse 76   Wt 61 kg (134 lb 6.4 oz)   LMP  (LMP Unknown) Comment: S/P LSH, BSO  BMI 26.25 kg/m²     General:  well developed; well nourished  no acute distress   Lungs:  breathing is unlabored   Heart:  Not performed.   Pelvis: Clinical staff was present for exam  External genitalia:  normal appearance of the external genitalia including Bartholin's and [place]'s glands. Continued improved labial erythema, minimal irritation from steroid overuse present bilaterally on the labia majora and labial folds   :  urethral meatus normal        Lab Review   No data reviewed    Imaging   No data reviewed        Assessment   Labial contact dermatitis, resolved       Plan   Discontinue Kenalog cream and return to vaginal estrogen cream only 2x weekly.   The importance of keeping all planned follow-up and taking all medications as prescribed was emphasized.  Follow up for vaginal exam 2025 or sooner PRN      No orders of the defined types were placed in this encounter.         This note was electronically signed.    Windy Corbett MD  May 2, 2024

## 2024-05-02 ENCOUNTER — OFFICE VISIT (OUTPATIENT)
Dept: OBSTETRICS AND GYNECOLOGY | Facility: CLINIC | Age: 74
End: 2024-05-02
Payer: MEDICARE

## 2024-05-02 VITALS
WEIGHT: 134.4 LBS | SYSTOLIC BLOOD PRESSURE: 108 MMHG | DIASTOLIC BLOOD PRESSURE: 62 MMHG | BODY MASS INDEX: 26.25 KG/M2 | HEART RATE: 76 BPM

## 2024-05-02 DIAGNOSIS — L24.0 IRRITANT CONTACT DERMATITIS DUE TO DETERGENT: Primary | ICD-10-CM

## 2024-05-02 RX ORDER — NEOMYCIN SULFATE, POLYMYXIN B SULFATE AND DEXAMETHASONE 3.5; 10000; 1 MG/ML; [USP'U]/ML; MG/ML
SUSPENSION/ DROPS OPHTHALMIC
COMMUNITY
Start: 2024-04-08

## 2024-07-09 NOTE — PROGRESS NOTES
Subjective   Chief Complaint   Patient presents with    Follow-up     Pt c/o intermittent vaginal burning mostly in the mornings X a couple of weeks.  Pt also c/o vaginal odor.     Xiao Dumont is a 74 y.o. year old .  No LMP recorded (lmp unknown). Patient has had a hysterectomy.  She states that is now feeling some slight burning and odor every other morning over the last few weeks. She has not used any of the steroid cream when her symptoms returned. She denies any vaginal bleeding or itching or discharge. She has continued to use the vaginal estrogen cream (Mon/Wed/Fri).  She endorses rare ROBERT with laughing only, and denies daily or recurrent urinary incontinence or leakage.    The following portions of the patient's history were reviewed and updated as appropriate:current medications and allergies    Social History    Tobacco Use      Smoking status: Never        Passive exposure: Never      Smokeless tobacco: Never         Objective   /60 (BP Location: Left arm, Patient Position: Sitting, Cuff Size: Adult)   Pulse 96   Wt 60 kg (132 lb 3.2 oz)   LMP  (LMP Unknown) Comment: S/P LSH, BSO  Breastfeeding No   BMI 25.82 kg/m²     General:  well developed; well nourished  no acute distress   Lungs:  breathing is unlabored   Heart:  Not performed.   Pelvis: Clinical staff was present for exam  External genitalia:  normal appearance of the external genitalia including Bartholin's and [place]'s glands. Markedly improved bilateral labial erythema, slight pallor bilaterally with inferior labial atrophy noted bilaterally, as well' biopsy taken from inferior left labia (see below for details)  :  urethral meatus normal;  Vaginal:  atrophic mucosal changes are present;  Cervix:  normal appearance.  Cystocele GRADE 2     Lab Review   No data reviewed    Imaging   No data reviewed        Assessment   Recurrent vulvar burning, irritation and odor   Vaginal odor   ROBERT  Stage 2 anterior vaginal prolapse       Plan   Leukorrhea swab collected for recurrent vaginal odor.   Due to recurrent vulvar symptoms despite steroid therapy use, recommend vulvar biopsy to rule out lichen sclerosis or underlying pathology.  Patient agreeable, see below for details.  Discussed if vaginal swab and biopsy normal, would recommend using steroid cream more often for flareups, along with possible urology evaluation for bladder leakage that patient is unaware of that is causing her odor and irritation.  All questions answered to the best of my ability, and patient in agreement with plan of care.  Continue vaginal estrogen cream.  The importance of keeping all planned follow-up and taking all medications as prescribed was emphasized.  Follow up pending biopsy and swab results or sooner PRN     No orders of the defined types were placed in this encounter.         This note was electronically signed.    Windy Corbett MD   July 10, 2024     Biopsy, left inferior labia    Date/Time: 7/10/2024 2:43 PM    Performed by: Windy Corbett MD  Authorized by: Windy Corbett MD  Preparation: Patient was prepped and draped in the usual sterile fashion.  Local anesthesia used: yes  Anesthesia: local infiltration    Anesthesia:  Local anesthesia used: yes  Local Anesthetic: lidocaine 2% with epinephrine  Anesthetic total: 1 mL    Sedation:  Patient sedated: no    Patient tolerance: patient tolerated the procedure well with no immediate complications  Comments: After review of informed consent, the patient's left inferior labia, near the introitus, was prepped with Betadine.  A small, 3 mm punch biopsy was taken from that site.  Excellent hemostasis was achieved with pressure and silver nitrate x 2.

## 2024-07-10 ENCOUNTER — OFFICE VISIT (OUTPATIENT)
Dept: OBSTETRICS AND GYNECOLOGY | Facility: CLINIC | Age: 74
End: 2024-07-10
Payer: MEDICARE

## 2024-07-10 VITALS
WEIGHT: 132.2 LBS | SYSTOLIC BLOOD PRESSURE: 112 MMHG | HEART RATE: 96 BPM | DIASTOLIC BLOOD PRESSURE: 60 MMHG | BODY MASS INDEX: 25.82 KG/M2

## 2024-07-10 DIAGNOSIS — N89.8 VAGINAL ODOR: ICD-10-CM

## 2024-07-10 DIAGNOSIS — N90.89 VULVAR IRRITATION: Primary | ICD-10-CM

## 2024-07-10 RX ORDER — TOBRAMYCIN 3 MG/ML
SOLUTION/ DROPS OPHTHALMIC
COMMUNITY
Start: 2024-05-28

## 2024-07-16 LAB — REF LAB TEST METHOD: NORMAL

## 2024-07-18 ENCOUNTER — TELEPHONE (OUTPATIENT)
Dept: OBSTETRICS AND GYNECOLOGY | Facility: CLINIC | Age: 74
End: 2024-07-18
Payer: MEDICARE

## 2024-07-18 DIAGNOSIS — L30.9 VULVAR DERMATITIS: Primary | ICD-10-CM

## 2024-07-18 RX ORDER — NITROFURANTOIN 25; 75 MG/1; MG/1
100 CAPSULE ORAL 2 TIMES DAILY
Qty: 14 CAPSULE | Refills: 0 | Status: SHIPPED | OUTPATIENT
Start: 2024-07-18 | End: 2024-07-25

## 2024-07-22 ENCOUNTER — TELEPHONE (OUTPATIENT)
Dept: OBSTETRICS AND GYNECOLOGY | Facility: CLINIC | Age: 74
End: 2024-07-22
Payer: MEDICARE

## 2024-07-22 NOTE — TELEPHONE ENCOUNTER
On chart review, I do not believe she has any history of kidney or liver dysfunction that I am aware of. That side effect is more typical with use >6 months. If she would prefer, I can check her kidney and liver function prior to taking the medicine with a CMP.     Yes, still ok to use the cream.     Windy Corbett MD

## 2024-07-22 NOTE — TELEPHONE ENCOUNTER
PT called this past Friday and was concerned about the medication she is taking prescribed by Dr. Corbett for:    nitrofurantoin, macrocrystal-monohydrate, (Macrobid) 100 MG capsule     Pt read where side effects were Liver issues and she has had high liver enzymes several times in the past. She also has been diagnosed with a fatty liver. PT states she is doing alright, but would like to wait to take the medication until she hears back.     Please advise

## 2024-07-22 NOTE — TELEPHONE ENCOUNTER
Called and spoke with patient, informed her of Dr. Corbett's response and advisement in full.  She verified understanding. She does not feel the need for a CMP prior and will go ahead and take medication as prescribed given that it is short term.

## 2024-07-22 NOTE — TELEPHONE ENCOUNTER
Called and spoke with patient to inform her that dermatology referral had been placed for patient last week. She states that she had called last week with the following question but had not heard back yet. She states that she is concerned about the Macrobid course as she is over 65 years of age and has a history of liver issues/fatty liver and the warnings state that the medication can cause liver damage in certain cases with liver patients, especially over 65?     She also would like to know if she can still use the triamcinolone cream as well?

## 2024-07-22 NOTE — TELEPHONE ENCOUNTER
I REFAXED OVER  REFERRAL TO [facility] FOR THIS PATIENTS- NO NEED TO CALL HER BACK REGARDING THE REFERRAL

## 2024-07-31 ENCOUNTER — TELEPHONE (OUTPATIENT)
Dept: OBSTETRICS AND GYNECOLOGY | Facility: CLINIC | Age: 74
End: 2024-07-31
Payer: MEDICARE

## 2024-07-31 RX ORDER — NITROFURANTOIN 25; 75 MG/1; MG/1
100 CAPSULE ORAL 2 TIMES DAILY
Qty: 14 CAPSULE | Refills: 0 | Status: SHIPPED | OUTPATIENT
Start: 2024-07-31

## 2024-07-31 NOTE — TELEPHONE ENCOUNTER
Dr. Corbett is out today (and going to be out a lot   in August--- several weeks).  I sent in another   Rx of the nitrofurantoin.  She may try an oral   probiotic, but much research says that it   doesn't help the vagina.  (It definitely helps the   gut ---- but probably not the vagina too much.)    If this 2nd round of pathogen directed oral   antibx does not completely relieve her   symptoms, she should sched appt and come   back in to see Dr. Corbett again. - MAGNOLIA Interiano     Called and spoke with patient, informed them of test results and physician advisement in full.  Patient verified understanding and had no further questions at this time.

## 2024-07-31 NOTE — TELEPHONE ENCOUNTER
Called and spoke with patient and she states that she took her last dose of Macrobid on Monday.  She states that her symptoms have improved, however the vaginal odor and irritation have lingered, even though they are not as bad as they were before.  She would like to know what she should do as next steps, and specifically asked if taking or using vaginal probiotics would be beneficial by chance?

## 2024-07-31 NOTE — TELEPHONE ENCOUNTER
This pt was given Nitrofurantoin Mono-MCR (?) and she is better, but still having symptoms. Pt has taken all of her medication. Also, she was reading her MDL results and said that she read where taking probiotics vaginally could help?     Please advise    Thank you

## 2024-08-20 NOTE — PROGRESS NOTES
Subjective   Chief Complaint   Patient presents with    Follow-up     Pt c/o some vaginal irritation when she gets up in the morning.  Pt also c/o some vaginal wetness.  Pt wants to discuss what she should do at this point.     Xiao Dumont is a 74 y.o. year old .  No LMP recorded (lmp unknown). Patient has had a hysterectomy.  She presents for recurrent vaginal irritation and wetness. She states her symptoms have improved after 2 rounds of Macrobid, but are still minimally present. She reports she finished the antibiotics approximately 2 weeks ago, and this morning, felt some slight irritation (not bad enough to warrant steroid cream use). She also reports the odor is not as bad, either, but is still present. She reports having soft stools and denies diarrhea, but is unsure if it is contaminating her vaginal area, as well. She does report ROBERT with sneezing, but is unsure if she is leaking and is unaware of it.     The following portions of the patient's history were reviewed and updated as appropriate:current medications, allergies, and past medical history    Social History    Tobacco Use      Smoking status: Never        Passive exposure: Never      Smokeless tobacco: Never         Objective   /72 (BP Location: Right arm, Patient Position: Sitting, Cuff Size: Adult)   Pulse 76   Wt 60.8 kg (134 lb)   LMP  (LMP Unknown) Comment: S/P LSH, BSO  Breastfeeding No   BMI 26.17 kg/m²     General:  well developed; well nourished  no acute distress   Skin:  No suspicious lesions seen   Lungs:  breathing is unlabored   Heart:  Not performed.   Pelvis: declines     Lab Review   One Swab results  7/10/24    Imaging   No data reviewed        Assessment   Persistent vaginal irritation      Plan   At this point as her symptoms are improving after the Macrobid, recommend continued observation with steroid cream as needed until she able to be seen by a Dermatologist (has an appointment in October, with a  second opinion appointment if needed in December).   The importance of keeping all planned follow-up and taking all medications as prescribed was emphasized.  Follow up for annual exam or sooner PRN      No orders of the defined types were placed in this encounter.         This note was electronically signed.    Windy Corbett MD  August 22, 2024

## 2024-08-21 ENCOUNTER — OFFICE VISIT (OUTPATIENT)
Dept: OBSTETRICS AND GYNECOLOGY | Facility: CLINIC | Age: 74
End: 2024-08-21
Payer: MEDICARE

## 2024-08-21 VITALS
BODY MASS INDEX: 26.17 KG/M2 | DIASTOLIC BLOOD PRESSURE: 72 MMHG | SYSTOLIC BLOOD PRESSURE: 120 MMHG | WEIGHT: 134 LBS | HEART RATE: 76 BPM

## 2024-08-21 DIAGNOSIS — N89.8 VAGINAL IRRITATION: Primary | ICD-10-CM

## 2024-08-21 DIAGNOSIS — N81.10 INCOMPLETE PROLAPSE OF ANTERIOR WALL OF VAGINA: ICD-10-CM

## 2024-08-21 LAB
BILIRUB UR QL STRIP: NEGATIVE
CLARITY UR: CLEAR
COLOR UR: YELLOW
GLUCOSE UR STRIP-MCNC: NEGATIVE MG/DL
HGB UR QL STRIP.AUTO: NEGATIVE
KETONES UR QL STRIP: NEGATIVE
LEUKOCYTE ESTERASE UR QL STRIP.AUTO: NEGATIVE
NITRITE UR QL STRIP: NEGATIVE
PH UR STRIP.AUTO: 6.5 [PH] (ref 5–8)
PROT UR QL STRIP: NEGATIVE
SP GR UR STRIP: 1.02 (ref 1–1.03)
UROBILINOGEN UR QL STRIP: NORMAL

## 2024-08-21 PROCEDURE — 1160F RVW MEDS BY RX/DR IN RCRD: CPT | Performed by: STUDENT IN AN ORGANIZED HEALTH CARE EDUCATION/TRAINING PROGRAM

## 2024-08-21 PROCEDURE — 81003 URINALYSIS AUTO W/O SCOPE: CPT | Performed by: STUDENT IN AN ORGANIZED HEALTH CARE EDUCATION/TRAINING PROGRAM

## 2024-08-21 PROCEDURE — 99212 OFFICE O/P EST SF 10 MIN: CPT | Performed by: STUDENT IN AN ORGANIZED HEALTH CARE EDUCATION/TRAINING PROGRAM

## 2024-08-21 PROCEDURE — 1159F MED LIST DOCD IN RCRD: CPT | Performed by: STUDENT IN AN ORGANIZED HEALTH CARE EDUCATION/TRAINING PROGRAM

## 2024-08-22 LAB — HOLD SPECIMEN: NORMAL

## 2025-02-07 ENCOUNTER — TRANSCRIBE ORDERS (OUTPATIENT)
Dept: ADMINISTRATIVE | Facility: HOSPITAL | Age: 75
End: 2025-02-07
Payer: MEDICARE

## 2025-02-07 DIAGNOSIS — N95.8 OTHER SPECIFIED MENOPAUSAL AND PERIMENOPAUSAL DISORDERS: Primary | ICD-10-CM

## 2025-02-14 ENCOUNTER — HOSPITAL ENCOUNTER (OUTPATIENT)
Dept: BONE DENSITY | Facility: HOSPITAL | Age: 75
Discharge: HOME | End: 2025-02-14
Payer: MEDICARE

## 2025-02-14 DIAGNOSIS — N95.8 OTHER SPECIFIED MENOPAUSAL AND PERIMENOPAUSAL DISORDERS: ICD-10-CM

## 2025-02-14 PROCEDURE — 77080 DXA BONE DENSITY AXIAL: CPT

## (undated) DEVICE — BLANKT WARM UPPR/BDY ARM/OUT 57X196CM

## (undated) DEVICE — REDUCER: Brand: ENDOWRIST

## (undated) DEVICE — SEAL

## (undated) DEVICE — VISUALIZATION SYSTEM: Brand: CLEARIFY

## (undated) DEVICE — SUT MNCRYL PLS ANTIB UD 4/0 PS2 18IN

## (undated) DEVICE — BNDR ABD PREMIUM/UNIV 10IN 27TO48IN

## (undated) DEVICE — DRSNG SURESITE WNDW 2.38X2.75

## (undated) DEVICE — PATIENT RETURN ELECTRODE, SINGLE-USE, CONTACT QUALITY MONITORING, ADULT, WITH 9FT CORD, FOR PATIENTS WEIGING OVER 33LBS. (15KG): Brand: MEGADYNE

## (undated) DEVICE — BLADELESS OBTURATOR: Brand: WECK VISTA

## (undated) DEVICE — PK UROL DAVINCI 10

## (undated) DEVICE — CANNULA SEAL

## (undated) DEVICE — Device

## (undated) DEVICE — GLV SURG SIGNATURE TOUCH PF LTX 8.5 STRL

## (undated) DEVICE — TRENDELENBURG WINGPAD POSITIONING KIT DELUXE - WITHOUT BODY STRAP: Brand: SOULE MEDICAL

## (undated) DEVICE — DRSNG TELFA PAD NONADH STR 1S 3X8IN

## (undated) DEVICE — ARM DRAPE

## (undated) DEVICE — ABDOMINAL BINDER: Brand: DEROYAL

## (undated) DEVICE — SNAP KOVER: Brand: UNBRANDED

## (undated) DEVICE — ADHS LIQ MASTISOL 2/3ML

## (undated) DEVICE — 3M™ IOBAN™ 2 ANTIMICROBIAL INCISE DRAPE 6650EZ: Brand: IOBAN™ 2

## (undated) DEVICE — TIP COVER ACCESSORY

## (undated) DEVICE — TOTAL TRAY, 16FR 10ML SIL FOLEY, URN: Brand: MEDLINE

## (undated) DEVICE — LAP PORT CLOSURE GUIDES 5MM AND 10/12MM: Brand: LAP PORT CLOSURE GUIDES 5MM AND 10/12MM

## (undated) DEVICE — APPL CHLORAPREP TINTED 26ML TEAL

## (undated) DEVICE — COLUMN DRAPE

## (undated) DEVICE — STRIP,CLOSURE,WOUND,MEDI-STRIP,1/2X4: Brand: MEDLINE

## (undated) DEVICE — GOWN,PREVENTION PLUS,XXLARGE,STERILE: Brand: MEDLINE

## (undated) DEVICE — ST TBG CONN PNEUMOCLEAR EVAC SMOKE HEAT/HUMID

## (undated) DEVICE — GLV SURG SIGNATURE TOUCH PF LTX 8 STRL

## (undated) DEVICE — SUT VIC 0 UR6 27IN VCP603H